# Patient Record
Sex: FEMALE | Race: WHITE | NOT HISPANIC OR LATINO | Employment: FULL TIME | URBAN - METROPOLITAN AREA
[De-identification: names, ages, dates, MRNs, and addresses within clinical notes are randomized per-mention and may not be internally consistent; named-entity substitution may affect disease eponyms.]

---

## 2018-12-19 ENCOUNTER — OFFICE VISIT (OUTPATIENT)
Dept: OBSTETRICS AND GYNECOLOGY | Facility: CLINIC | Age: 53
End: 2018-12-19
Payer: OTHER GOVERNMENT

## 2018-12-19 VITALS
WEIGHT: 191.81 LBS | DIASTOLIC BLOOD PRESSURE: 84 MMHG | HEIGHT: 64 IN | BODY MASS INDEX: 32.74 KG/M2 | SYSTOLIC BLOOD PRESSURE: 136 MMHG

## 2018-12-19 DIAGNOSIS — Z01.419 ENCOUNTER FOR GYNECOLOGICAL EXAMINATION (GENERAL) (ROUTINE) WITHOUT ABNORMAL FINDINGS: Primary | ICD-10-CM

## 2018-12-19 DIAGNOSIS — Z12.4 SCREENING FOR CERVICAL CANCER: ICD-10-CM

## 2018-12-19 DIAGNOSIS — Z12.31 SCREENING MAMMOGRAM, ENCOUNTER FOR: ICD-10-CM

## 2018-12-19 DIAGNOSIS — R68.82 DECREASED LIBIDO: ICD-10-CM

## 2018-12-19 PROBLEM — Z86.2 HISTORY OF IRON DEFICIENCY ANEMIA: Status: ACTIVE | Noted: 2017-03-20

## 2018-12-19 PROBLEM — J30.1 SEASONAL ALLERGIC RHINITIS DUE TO POLLEN: Status: ACTIVE | Noted: 2017-03-20

## 2018-12-19 PROCEDURE — 88175 CYTOPATH C/V AUTO FLUID REDO: CPT

## 2018-12-19 PROCEDURE — 99999 PR PBB SHADOW E&M-NEW PATIENT-LVL III: CPT | Mod: PBBFAC,,, | Performed by: OBSTETRICS & GYNECOLOGY

## 2018-12-19 PROCEDURE — 87624 HPV HI-RISK TYP POOLED RSLT: CPT

## 2018-12-19 PROCEDURE — 99203 OFFICE O/P NEW LOW 30 MIN: CPT | Mod: PBBFAC,PN | Performed by: OBSTETRICS & GYNECOLOGY

## 2018-12-19 PROCEDURE — 99386 PREV VISIT NEW AGE 40-64: CPT | Mod: S$PBB,,, | Performed by: OBSTETRICS & GYNECOLOGY

## 2018-12-19 RX ORDER — MONTELUKAST SODIUM 10 MG/1
10 TABLET ORAL DAILY
COMMUNITY

## 2018-12-19 RX ORDER — FLUTICASONE FUROATE AND VILANTEROL 200; 25 UG/1; UG/1
POWDER RESPIRATORY (INHALATION)
COMMUNITY
Start: 2018-03-15

## 2018-12-19 NOTE — PROGRESS NOTES
Subjective:       Patient ID: Beena Potter is a 53 y.o. female.    Chief Complaint:  Well Woman      History of Present Illness  HPI  Annual Exam-Postmenopausal  Patient presents for annual exam. The patient has no complaints today. The patient is sexually active.--occasional vaginal dryness; libido ok; denies pelvic pain;  GYN screening history: last pap: approximate date  and was normal. mammo due; normal 2017l; The patient is not currently taking hormone replacement therapy. Patient denies post-menopausal vaginal bleeding. The patient wears seatbelts: yes. The patient participates in regular exercise: yes--walkig on treadmill--45 min--5 days/wk; . Has the patient ever been transfused or tattooed?: yes.--transfused years ago;  The patient reports that there is not domestic violence in her life.    Denies hot flushes, night sweat  No problems sleeping    occas leak with jumping; no urgency;          GYN & OB History  Patient's last menstrual period was 2014 (approximate).   Date of Last Pap: No result found    OB History    Para Term  AB Living   2 2       2   SAB TAB Ectopic Multiple Live Births           2      # Outcome Date GA Lbr Iam/2nd Weight Sex Delivery Anes PTL Lv   2 Para      Vag-Spont   EDU   1 Para      Vag-Spont   EDU          Review of Systems  Review of Systems   All other systems reviewed and are negative.          Objective:      Physical Exam:   Constitutional: She appears well-developed.     Eyes: Conjunctivae and EOM are normal. Pupils are equal, round, and reactive to light.    Neck: Normal range of motion. Neck supple.     Pulmonary/Chest: Effort normal. Right breast exhibits no mass, no nipple discharge, no skin change and no tenderness. Left breast exhibits no mass, no nipple discharge, no skin change and no tenderness. Breasts are symmetrical.        Abdominal: Soft.     Genitourinary: Rectum normal, vagina normal and uterus normal. Pelvic exam was performed  with patient supine. Cervix is normal. Right adnexum displays no mass and no tenderness. Left adnexum displays no mass and no tenderness. No erythema, bleeding, rectocele, cystocele or unspecified prolapse of vaginal walls in the vagina. No vaginal discharge found. Labial bartholins normal.  Genitourinary Comments: Atrophic          Uterus Size: 6 cm   Musculoskeletal: Normal range of motion.       Neurological: She is alert.    Skin: Skin is warm.    Psychiatric: She has a normal mood and affect.           Assessment:     Encounter Diagnoses   Name Primary?    Encounter for gynecological examination (general) (routine) without abnormal findings Yes    Screening for cervical cancer     Screening mammogram, encounter for     Decreased libido                Plan:      Continue annual well woman exam.  Pap today; Reviewed updated recommendations for pap smears (every 3 years) in low risk patients.   Recommend annual pelvic exams.  Reviewed recommendations for annual CBE.  mammo ordered, continue yearly until age 75  Osteoporosis prevention; 1200mg calcium/d with source of vitamin d   Continue diet, exercise, weight loss   reviewed use of replens and avlamil for vaginal atrophy and decreased libido

## 2018-12-26 LAB
HPV HR 12 DNA CVX QL NAA+PROBE: NEGATIVE
HPV16 AG SPEC QL: NEGATIVE
HPV18 DNA SPEC QL NAA+PROBE: NEGATIVE

## 2019-01-02 ENCOUNTER — TELEPHONE (OUTPATIENT)
Dept: OBSTETRICS AND GYNECOLOGY | Facility: CLINIC | Age: 54
End: 2019-01-02

## 2019-01-17 ENCOUNTER — HOSPITAL ENCOUNTER (OUTPATIENT)
Dept: RADIOLOGY | Facility: HOSPITAL | Age: 54
Discharge: HOME | End: 2019-01-17
Attending: OBSTETRICS & GYNECOLOGY
Payer: OTHER GOVERNMENT

## 2019-01-17 DIAGNOSIS — Z12.31 SCREENING MAMMOGRAM, ENCOUNTER FOR: ICD-10-CM

## 2019-01-17 PROCEDURE — 77067 MAMMO DIGITAL SCREENING BILAT WITH CAD: ICD-10-PCS | Mod: 26,,, | Performed by: RADIOLOGY

## 2019-01-17 PROCEDURE — 77067 SCR MAMMO BI INCL CAD: CPT | Mod: 26,,, | Performed by: RADIOLOGY

## 2019-01-17 PROCEDURE — 77067 SCR MAMMO BI INCL CAD: CPT | Mod: TC

## 2019-01-29 ENCOUNTER — TELEPHONE (OUTPATIENT)
Dept: OBSTETRICS AND GYNECOLOGY | Facility: CLINIC | Age: 54
End: 2019-01-29

## 2019-01-29 NOTE — TELEPHONE ENCOUNTER
Spoke to the pt. And advised her that her mammogram shows left breast calcifications (she should have been contacted by radiology to schedule additional imaging) I will continue to look out for the follow up report. Pt. Stated she has an appt,. with Radiology. carli Cisse    Please advise her mammogram shows left breast calcifications (she should have been contacted by radiology to schedule additional imaging) I will continue to look out for the follow up report

## 2019-01-31 ENCOUNTER — HOSPITAL ENCOUNTER (OUTPATIENT)
Dept: RADIOLOGY | Facility: HOSPITAL | Age: 54
Discharge: HOME | End: 2019-01-31
Attending: OBSTETRICS & GYNECOLOGY
Payer: OTHER GOVERNMENT

## 2019-01-31 DIAGNOSIS — R92.8 ABNORMAL MAMMOGRAM: ICD-10-CM

## 2019-01-31 PROCEDURE — 77065 DX MAMMO INCL CAD UNI: CPT | Mod: TC,LT

## 2019-01-31 PROCEDURE — 77065 DX MAMMO INCL CAD UNI: CPT | Mod: 26,LT,, | Performed by: RADIOLOGY

## 2019-01-31 PROCEDURE — 77065 MAMMO DIGITAL DIAGNOSTIC LEFT WITH CAD: ICD-10-PCS | Mod: 26,LT,, | Performed by: RADIOLOGY

## 2019-02-01 ENCOUNTER — TELEPHONE (OUTPATIENT)
Dept: RADIOLOGY | Facility: HOSPITAL | Age: 54
End: 2019-02-01

## 2019-08-01 ENCOUNTER — HOSPITAL ENCOUNTER (OUTPATIENT)
Dept: RADIOLOGY | Facility: HOSPITAL | Age: 54
Discharge: HOME | End: 2019-08-01
Attending: OBSTETRICS & GYNECOLOGY
Payer: OTHER GOVERNMENT

## 2019-08-01 DIAGNOSIS — R92.8 ABNORMAL MAMMOGRAM: ICD-10-CM

## 2019-08-01 PROCEDURE — 77065 DX MAMMO INCL CAD UNI: CPT | Mod: TC,LT

## 2019-08-01 PROCEDURE — 77065 MAMMO DIGITAL DIAGNOSTIC LEFT WITH CAD: ICD-10-PCS | Mod: 26,LT,, | Performed by: RADIOLOGY

## 2019-08-01 PROCEDURE — 77065 DX MAMMO INCL CAD UNI: CPT | Mod: 26,LT,, | Performed by: RADIOLOGY

## 2020-02-20 ENCOUNTER — HOSPITAL ENCOUNTER (OUTPATIENT)
Dept: RADIOLOGY | Facility: HOSPITAL | Age: 55
Discharge: HOME | End: 2020-02-20
Attending: OBSTETRICS & GYNECOLOGY
Payer: OTHER GOVERNMENT

## 2020-02-20 DIAGNOSIS — R92.8 ABNORMAL MAMMOGRAM: ICD-10-CM

## 2020-02-20 PROCEDURE — 77066 MAMMO DIGITAL DIAGNOSTIC BILAT WITH CAD: ICD-10-PCS | Mod: 26,,, | Performed by: RADIOLOGY

## 2020-02-20 PROCEDURE — 77066 DX MAMMO INCL CAD BI: CPT | Mod: 26,,, | Performed by: RADIOLOGY

## 2020-02-20 PROCEDURE — 77066 DX MAMMO INCL CAD BI: CPT | Mod: TC

## 2021-04-29 ENCOUNTER — PATIENT MESSAGE (OUTPATIENT)
Dept: RESEARCH | Facility: HOSPITAL | Age: 56
End: 2021-04-29

## 2022-01-06 ENCOUNTER — OFFICE VISIT (OUTPATIENT)
Dept: OBSTETRICS AND GYNECOLOGY | Facility: CLINIC | Age: 57
End: 2022-01-06
Payer: OTHER GOVERNMENT

## 2022-01-06 VITALS
BODY MASS INDEX: 39.11 KG/M2 | WEIGHT: 229.06 LBS | DIASTOLIC BLOOD PRESSURE: 90 MMHG | HEIGHT: 64 IN | SYSTOLIC BLOOD PRESSURE: 132 MMHG

## 2022-01-06 DIAGNOSIS — Z12.31 SCREENING MAMMOGRAM, ENCOUNTER FOR: Primary | ICD-10-CM

## 2022-01-06 DIAGNOSIS — Z12.4 SCREENING FOR CERVICAL CANCER: ICD-10-CM

## 2022-01-06 DIAGNOSIS — Z01.419 ENCOUNTER FOR GYNECOLOGICAL EXAMINATION (GENERAL) (ROUTINE) WITHOUT ABNORMAL FINDINGS: ICD-10-CM

## 2022-01-06 DIAGNOSIS — N39.3 STRESS INCONTINENCE OF URINE: ICD-10-CM

## 2022-01-06 PROCEDURE — 99999 PR PBB SHADOW E&M-EST. PATIENT-LVL III: CPT | Mod: PBBFAC,,, | Performed by: OBSTETRICS & GYNECOLOGY

## 2022-01-06 PROCEDURE — 99213 OFFICE O/P EST LOW 20 MIN: CPT | Mod: PBBFAC,PN | Performed by: OBSTETRICS & GYNECOLOGY

## 2022-01-06 PROCEDURE — 87624 HPV HI-RISK TYP POOLED RSLT: CPT | Performed by: OBSTETRICS & GYNECOLOGY

## 2022-01-06 PROCEDURE — 99999 PR PBB SHADOW E&M-EST. PATIENT-LVL III: ICD-10-PCS | Mod: PBBFAC,,, | Performed by: OBSTETRICS & GYNECOLOGY

## 2022-01-06 PROCEDURE — 88175 CYTOPATH C/V AUTO FLUID REDO: CPT | Performed by: OBSTETRICS & GYNECOLOGY

## 2022-01-06 PROCEDURE — 99386 PREV VISIT NEW AGE 40-64: CPT | Mod: S$PBB,,, | Performed by: OBSTETRICS & GYNECOLOGY

## 2022-01-06 PROCEDURE — 99386 PR PREVENTIVE VISIT,NEW,40-64: ICD-10-PCS | Mod: S$PBB,,, | Performed by: OBSTETRICS & GYNECOLOGY

## 2022-01-06 RX ORDER — TESTOSTERONE ENANTHATE 200 MG/ML
VIAL (ML) INTRAMUSCULAR
COMMUNITY
End: 2022-01-06 | Stop reason: CLARIF

## 2022-01-06 RX ORDER — OXYBUTYNIN CHLORIDE 5 MG/1
5 TABLET ORAL 2 TIMES DAILY
Qty: 60 TABLET | Refills: 11 | Status: SHIPPED | OUTPATIENT
Start: 2022-01-06 | End: 2022-07-11

## 2022-01-06 RX ORDER — SPIRONOLACTONE 25 MG/1
25 TABLET ORAL 2 TIMES DAILY
COMMUNITY
Start: 2021-11-15

## 2022-01-06 RX ORDER — PROGESTERONE 200 MG/1
CAPSULE ORAL
COMMUNITY
Start: 2021-12-27

## 2022-01-06 NOTE — PROGRESS NOTES
Subjective:       Patient ID: Beena Potter is a 56 y.o. female.    Chief Complaint:  Well Woman      History of Present Illness  HPI  Annual Exam-Postmenopausal  Patient presents for annual exam. The patient reports worsening leaking with cough/sneeze denies urgency; . The patient is sexually active.--sex drive improved; since testosterone pellets;  GYN screening history: last pap: approximate date 2018 and was normal and last mammogram: approximate date  and was normal. The patient is taking hormone replacement therapy--using estrogen/testosterone pellets; . Patient denies post-menopausal vaginal bleeding. The patient wears seatbelts: yes. The patient participates in regular exercise: no. Still in physical therapy since foot surgery;  Has the patient ever been transfused or tattooed?: yes.-+transfused   The patient reports that there is not domestic violence in her life.  No problems sleeping  Denies hot flushes;   GYN & OB History  Patient's last menstrual period was 2014 (approximate).   Date of Last Pap: No result found    OB History    Para Term  AB Living   2 2 0     2   SAB IAB Ectopic Multiple Live Births           2      # Outcome Date GA Lbr Iam/2nd Weight Sex Delivery Anes PTL Lv   2 Para      Vag-Spont   EDU   1 Para      Vag-Spont   EDU       Review of Systems  Review of Systems   Genitourinary: Positive for decreased libido, hot flashes and urinary incontinence.   All other systems reviewed and are negative.          Objective:      Physical Exam:   Constitutional: She is oriented to person, place, and time. She appears well-developed and well-nourished.     Eyes: Pupils are equal, round, and reactive to light. Conjunctivae and EOM are normal.      Pulmonary/Chest: Effort normal. Right breast exhibits no mass, no nipple discharge, no skin change and no tenderness. Left breast exhibits no mass, no nipple discharge, no skin change and no tenderness. Breasts are symmetrical.         Abdominal: Soft.     Genitourinary:    Vagina, uterus, right adnexa, left adnexa and rectum normal.      Pelvic exam was performed with patient supine.   The external female genitalia was normal.   Labial bartholins normal.Cervix is normal. Right adnexum displays no mass and no tenderness. Left adnexum displays no mass and no tenderness. No erythema,  no vaginal discharge, bleeding, rectocele, cystocele or unspecified prolapse of vaginal walls in the vagina. Vagina was moist.Also,  pap results normal  (collected)  Uerus contour normal  Normal urethral meatus.Urethra findings: no urethral massBladder findings: no bladder distention and no bladder tenderness          Musculoskeletal: Normal range of motion and moves all extremeties.       Neurological: She is alert and oriented to person, place, and time.    Skin: Skin is warm.    Psychiatric: She has a normal mood and affect. Her behavior is normal.           Assessment:        1. Screening mammogram, encounter for    2. Encounter for gynecological examination (general) (routine) without abnormal findings    3. Screening for cervical cancer    4. Stress incontinence of urine               Plan:      Continue annual well woman exam.  Pap today; Reviewed updated recommendations for pap smears (every 3 years) in low risk patients.   Recommend annual pelvic exams.  Reviewed recommendations for annual CBE.  mammo ordered, continue yearly until age 77  rx sent for oxybutynin for bladder  Continue diet, exercise, weight loss

## 2022-01-10 ENCOUNTER — HOSPITAL ENCOUNTER (OUTPATIENT)
Dept: RADIOLOGY | Facility: HOSPITAL | Age: 57
Discharge: HOME | End: 2022-01-10
Attending: OBSTETRICS & GYNECOLOGY
Payer: OTHER GOVERNMENT

## 2022-01-10 VITALS — HEIGHT: 64 IN | WEIGHT: 229.06 LBS | BODY MASS INDEX: 39.11 KG/M2

## 2022-01-10 DIAGNOSIS — Z12.31 SCREENING MAMMOGRAM, ENCOUNTER FOR: ICD-10-CM

## 2022-01-10 PROCEDURE — 77063 BREAST TOMOSYNTHESIS BI: CPT | Mod: 26,,, | Performed by: RADIOLOGY

## 2022-01-10 PROCEDURE — 77063 MAMMO DIGITAL SCREENING BILAT WITH TOMO: ICD-10-PCS | Mod: 26,,, | Performed by: RADIOLOGY

## 2022-01-10 PROCEDURE — 77067 MAMMO DIGITAL SCREENING BILAT WITH TOMO: ICD-10-PCS | Mod: 26,,, | Performed by: RADIOLOGY

## 2022-01-10 PROCEDURE — 77067 SCR MAMMO BI INCL CAD: CPT | Mod: 26,,, | Performed by: RADIOLOGY

## 2022-01-10 PROCEDURE — 77063 BREAST TOMOSYNTHESIS BI: CPT | Mod: TC

## 2022-01-11 LAB
FINAL PATHOLOGIC DIAGNOSIS: NORMAL
Lab: NORMAL

## 2022-01-17 LAB
HPV HR 12 DNA SPEC QL NAA+PROBE: NEGATIVE
HPV16 AG SPEC QL: NEGATIVE
HPV18 DNA SPEC QL NAA+PROBE: NEGATIVE

## 2022-01-19 ENCOUNTER — PATIENT MESSAGE (OUTPATIENT)
Dept: OBSTETRICS AND GYNECOLOGY | Facility: CLINIC | Age: 57
End: 2022-01-19
Payer: OTHER GOVERNMENT

## 2022-01-19 DIAGNOSIS — N39.3 STRESS INCONTINENCE OF URINE: Primary | ICD-10-CM

## 2022-06-30 ENCOUNTER — TELEPHONE (OUTPATIENT)
Dept: OBSTETRICS AND GYNECOLOGY | Facility: CLINIC | Age: 57
End: 2022-06-30
Payer: OTHER GOVERNMENT

## 2022-06-30 NOTE — TELEPHONE ENCOUNTER
----- Message from Heidi Guillen sent at 6/30/2022  1:13 PM CDT -----  Contact: Patient, [phone]  Calling to schedule an appointment with Dr Babin in [place]. Please call her. Thanks.

## 2022-07-11 ENCOUNTER — OFFICE VISIT (OUTPATIENT)
Dept: OBSTETRICS AND GYNECOLOGY | Facility: CLINIC | Age: 57
End: 2022-07-11
Payer: OTHER GOVERNMENT

## 2022-07-11 VITALS
SYSTOLIC BLOOD PRESSURE: 122 MMHG | BODY MASS INDEX: 39.38 KG/M2 | DIASTOLIC BLOOD PRESSURE: 86 MMHG | HEIGHT: 64 IN | WEIGHT: 230.69 LBS

## 2022-07-11 DIAGNOSIS — N95.0 POST-MENOPAUSAL BLEEDING: Primary | ICD-10-CM

## 2022-07-11 DIAGNOSIS — N39.3 STRESS INCONTINENCE OF URINE: ICD-10-CM

## 2022-07-11 PROCEDURE — 99999 PR PBB SHADOW E&M-EST. PATIENT-LVL III: CPT | Mod: PBBFAC,,, | Performed by: OBSTETRICS & GYNECOLOGY

## 2022-07-11 PROCEDURE — 99213 PR OFFICE/OUTPT VISIT, EST, LEVL III, 20-29 MIN: ICD-10-PCS | Mod: S$PBB,,, | Performed by: OBSTETRICS & GYNECOLOGY

## 2022-07-11 PROCEDURE — 99999 PR PBB SHADOW E&M-EST. PATIENT-LVL III: ICD-10-PCS | Mod: PBBFAC,,, | Performed by: OBSTETRICS & GYNECOLOGY

## 2022-07-11 PROCEDURE — 99213 OFFICE O/P EST LOW 20 MIN: CPT | Mod: PBBFAC,PN | Performed by: OBSTETRICS & GYNECOLOGY

## 2022-07-11 PROCEDURE — 99213 OFFICE O/P EST LOW 20 MIN: CPT | Mod: S$PBB,,, | Performed by: OBSTETRICS & GYNECOLOGY

## 2022-07-11 NOTE — PROGRESS NOTES
"History & Physical    SUBJECTIVE:     History of Present Illness:  Patient is a 56 y.o. female presents with postmenopausal bleeding for past 2 wks; reports menopausal since xrt in 2014.  Currently using estrogen/testosterone pellets and nightly prometrium (200mg  Added additional prometrium 100mg bid .  Had informal sono showing polyps.  Wishes to proceed with hysteroscopy with d&c/myosure    Review of patient's allergies indicates:   Allergen Reactions    Scopolamine Rash       Current Outpatient Medications   Medication Sig Dispense Refill    estradiol 50 mg pellet Inject 50 mg into the muscle.      fluticasone furoate-vilanteroL (BREO) 200-25 mcg/dose DsDv diskus inhaler       montelukast (SINGULAIR) 10 mg tablet Take 10 mg by mouth.      progesterone (PROMETRIUM) 200 MG capsule TAKE 1 CAPSULE BY MOUTH NIGHTLY AT BEDTIME      spironolactone (ALDACTONE) 25 MG tablet Take 25 mg by mouth 2 (two) times daily.      testosterone 100 mg pellet Inject 100 mg into the muscle.      mirabegron (MYRBETRIQ) 25 mg Tb24 ER tablet Take 1 tablet (25 mg total) by mouth once daily. 30 tablet 11     No current facility-administered medications for this visit.       Past Medical History:   Diagnosis Date    Asthma     Colon cancer 2013    radiation and chemo    Rectal cancer 08/01/2012     Past Surgical History:   Procedure Laterality Date    colon cancer  08/01/2012    FOOT SURGERY Right      Family History   Problem Relation Age of Onset    Hypertension Father     Hypertension Mother      Social History     Tobacco Use    Smoking status: Never Smoker    Smokeless tobacco: Never Used   Substance Use Topics    Alcohol use: No    Drug use: No        Review of Systems:  Review of Systems   Genitourinary: Positive for menstrual problem.   All other systems reviewed and are negative.      OBJECTIVE:     Vital Signs (Most Recent)  BP: 122/86 (07/11/22 0732)  5' 4" (1.626 m)  104.6 kg (230 lb 11.4 oz)     Physical " Exam:  Physical Exam  Vitals reviewed.   Constitutional:       Appearance: She is well-developed.   HENT:      Head: Normocephalic.   Eyes:      Conjunctiva/sclera: Conjunctivae normal.      Pupils: Pupils are equal, round, and reactive to light.   Cardiovascular:      Rate and Rhythm: Normal rate.   Pulmonary:      Effort: Pulmonary effort is normal.      Breath sounds: Normal breath sounds.   Abdominal:      Palpations: Abdomen is soft.   Musculoskeletal:         General: Normal range of motion.      Cervical back: Normal range of motion.   Skin:     General: Skin is warm and dry.   Neurological:      Mental Status: She is alert and oriented to person, place, and time.   Psychiatric:         Behavior: Behavior normal.         Thought Content: Thought content normal.         Judgment: Judgment normal.         Laboratory  cbc, bmp, hcg to be done preop    Diagnostic Results:  none    ASSESSMENT/PLAN:     Encounter Diagnoses   Name Primary?    Post-menopausal bleeding Yes    Stress incontinence of urine          PLAN:Plan     Reviewed hysteroscopy with d&c/myosure  procedure in detail.  Reviewed risks including but not limited to infection, bleeding, damage to bowel/bladder, cva;htn, dvt, death..    All questions answered to the best of my ability.  Alternatives reviewed --emb.  Pt wishes to proceed with hysteroscopy with d&c/myosure.    Consents signed witnessed  Post op course reviewed

## 2022-07-11 NOTE — PROGRESS NOTES
Subjective:       Patient ID: Beena Potter is a 56 y.o. female.    Chief Complaint:  No chief complaint on file.      History of Present Illness  HPI  {OBG HPI BLOCKS:35705}    GYN & OB History  Patient's last menstrual period was 2014 (approximate).   Date of Last Pap: 2022    OB History    Para Term  AB Living   2 2 0     2   SAB IAB Ectopic Multiple Live Births           2      # Outcome Date GA Lbr Iam/2nd Weight Sex Delivery Anes PTL Lv   2 Para      Vag-Spont   EDU   1 Para      Vag-Spont   EDU       Review of Systems  Review of Systems        Objective:      Physical Exam        Assessment:        1. Post-menopausal bleeding    2. Stress incontinence of urine               Plan:      Continue annual well woman exam.

## 2022-07-12 ENCOUNTER — TELEPHONE (OUTPATIENT)
Dept: OBSTETRICS AND GYNECOLOGY | Facility: CLINIC | Age: 57
End: 2022-07-12
Payer: OTHER GOVERNMENT

## 2022-07-12 DIAGNOSIS — N95.0 POST-MENOPAUSAL BLEEDING: Primary | ICD-10-CM

## 2022-07-21 ENCOUNTER — PATIENT MESSAGE (OUTPATIENT)
Dept: OBSTETRICS AND GYNECOLOGY | Facility: CLINIC | Age: 57
End: 2022-07-21
Payer: OTHER GOVERNMENT

## 2022-07-21 NOTE — TELEPHONE ENCOUNTER
PA done, pt require to try to use detrol LA or oxybutynin IR      pharmacy program   Case#[id number]   Telephone: [phone]

## 2022-08-18 DIAGNOSIS — Z01.818 PRE-OP TESTING: Primary | ICD-10-CM

## 2022-08-18 NOTE — PRE ADMISSION SCREENING
Pre op instructions reviewed with Pt per phone: Spoke about pre op process and surgery instructions, verbalized understanding.    Surgery is scheduled on 8/31/22. Please arrive at 6:45am. We will call you the afternoon prior to surgery to confirm arrival time, as it is subject to change due to cancellations & emergencies.    Please report to the [facility] (1st Floor) at [facility] located off of [facility] access hospital (2nd building on the left, in front of the flag pole).  Address: [address] [place] [address] [place]. [address]        INSTRUCTIONS IMPORTANT!!!  Do Not Eat, Drink, or Smoke after 12 midnight unless instructed otherwise by your Surgeon. OK to brush teeth, no gum, candy or mints!      *Take Only these medications with a small sip of water morning of surgery:  Trelegy inhaler    ____  NO Acrylic/fake nails or nail polish worn day of surgery (specifically hand/arm & foot surgeries).  ____  NO powder, lotions, deodorants, oils or creams on body.  ____  Remove jewelry & piercings prior to surgery.  ____  Dentures, Hearing Aids & Contact Lens will need to be removed prior to the start of surgery.  ____  Bring photo ID and insurance information to hospital (Leave Valuables at Home).  ____  If going home the same day, arrange for a ride home. You will not be able to drive 24 hrs if Anesthesia was used.   ____  Females (ages 11-60) may need to give a urine sample the morning of surgery; please see Pre op Nurse prior to using the restroom.  ____  Wear clean, loose fitting clothing. Allow for dressings, bandages.  ____  Stop all Aspirin products, Ibuprofen, Advil, Motrin & Aleve at least 5-7 days before surgery, unless otherwise instructed by your Physician office (May use Tylenol).  ____  Blood Thinners are stopped based on your Provider's recommendation; Call Surgeon's Office to inquire when to stop/hold.  ____  Stop taking any Fish Oil supplements or Vitamins at least 5-7 days prior to surgery, unless instructed  otherwise by your Physician office.            Diabetic Patients: If you take diabetic medication, do NOT take morning of surgery unless instructed by             Doctor. Metformin to be stopped 24 hrs prior to surgery time. DO NOT take long-acting insulin the evening before surgery. Blood sugars will be checked in pre-op by Nurse.    Bathing Instructions:    -Do not shave your face or body the day before or the day of surgery.  -Do not shave pubic hair 7 days prior to surgery (gyn pt's).   -Shower & Rinse your body as usual with anti-bacterial Soap (Dial, Lever 2000, or Hibiclens)   -Do not use Hibiclens on your head, face, or genitals.   -Do not wash with anti-bacterial soap after you use the Hibiclens.   -Rinse your body thoroughly.      [facility] Visitor/Ride Policy:  Only 2 adults allowed (over the age of 18) to accompany you to the Hospital. You Must have a ride home from a responsible adult that you know and trust. Medical Transport, Uber or Lyft can only be used if patient has a responsible adult to accompany them during ride home.    Post-Op Instructions: You will receive Post-op/Discharge instructions by your Discharge Nurse prior to going home. Please call your Surgeon's office with any post-surgery questions/concerns @ [phone].    *If you are running late or have questions the morning of surgery, please call the Surgery Dept @ [phone]  *Insurance/ Financial Questions, please call [phone].    Thank you,  -[facility] Pre Admit Testing Dept.  [phone] or [phone]  M-F 7:30 am-4 pm

## 2022-08-24 ENCOUNTER — LAB VISIT (OUTPATIENT)
Dept: LAB | Facility: HOSPITAL | Age: 57
End: 2022-08-24
Attending: OBSTETRICS & GYNECOLOGY
Payer: OTHER GOVERNMENT

## 2022-08-24 DIAGNOSIS — N95.0 POST-MENOPAUSAL BLEEDING: ICD-10-CM

## 2022-08-24 LAB
ANION GAP SERPL CALC-SCNC: 8 MMOL/L (ref 8–16)
BASOPHILS # BLD AUTO: 0.08 K/UL (ref 0–0.2)
BASOPHILS NFR BLD: 1.2 % (ref 0–1.9)
BUN SERPL-MCNC: 17 MG/DL (ref 6–20)
CALCIUM SERPL-MCNC: 9.1 MG/DL (ref 8.7–10.5)
CHLORIDE SERPL-SCNC: 103 MMOL/L (ref 95–110)
CO2 SERPL-SCNC: 25 MMOL/L (ref 23–29)
CREAT SERPL-MCNC: 0.8 MG/DL (ref 0.5–1.4)
DIFFERENTIAL METHOD: ABNORMAL
EOSINOPHIL # BLD AUTO: 0.3 K/UL (ref 0–0.5)
EOSINOPHIL NFR BLD: 4.6 % (ref 0–8)
ERYTHROCYTE [DISTWIDTH] IN BLOOD BY AUTOMATED COUNT: 12 % (ref 11.5–14.5)
EST. GFR  (NO RACE VARIABLE): >60 ML/MIN/1.73 M^2
GLUCOSE SERPL-MCNC: 128 MG/DL (ref 70–110)
HCT VFR BLD AUTO: 42.2 % (ref 37–48.5)
HGB BLD-MCNC: 14.1 G/DL (ref 12–16)
IMM GRANULOCYTES # BLD AUTO: 0.03 K/UL (ref 0–0.04)
IMM GRANULOCYTES NFR BLD AUTO: 0.4 % (ref 0–0.5)
LYMPHOCYTES # BLD AUTO: 2.1 K/UL (ref 1–4.8)
LYMPHOCYTES NFR BLD: 30 % (ref 18–48)
MCH RBC QN AUTO: 31.8 PG (ref 27–31)
MCHC RBC AUTO-ENTMCNC: 33.4 G/DL (ref 32–36)
MCV RBC AUTO: 95 FL (ref 82–98)
MONOCYTES # BLD AUTO: 0.5 K/UL (ref 0.3–1)
MONOCYTES NFR BLD: 7.5 % (ref 4–15)
NEUTROPHILS # BLD AUTO: 3.9 K/UL (ref 1.8–7.7)
NEUTROPHILS NFR BLD: 56.3 % (ref 38–73)
NRBC BLD-RTO: 0 /100 WBC
PLATELET # BLD AUTO: 265 K/UL (ref 150–450)
PMV BLD AUTO: 11 FL (ref 9.2–12.9)
POTASSIUM SERPL-SCNC: 4.3 MMOL/L (ref 3.5–5.1)
RBC # BLD AUTO: 4.44 M/UL (ref 4–5.4)
SODIUM SERPL-SCNC: 136 MMOL/L (ref 136–145)
WBC # BLD AUTO: 6.9 K/UL (ref 3.9–12.7)

## 2022-08-24 PROCEDURE — 85025 COMPLETE CBC W/AUTO DIFF WBC: CPT | Performed by: OBSTETRICS & GYNECOLOGY

## 2022-08-24 PROCEDURE — 36415 COLL VENOUS BLD VENIPUNCTURE: CPT | Mod: PO | Performed by: OBSTETRICS & GYNECOLOGY

## 2022-08-24 PROCEDURE — 80048 BASIC METABOLIC PNL TOTAL CA: CPT | Performed by: OBSTETRICS & GYNECOLOGY

## 2022-08-30 ENCOUNTER — TELEPHONE (OUTPATIENT)
Dept: PREADMISSION TESTING | Facility: HOSPITAL | Age: 57
End: 2022-08-30
Payer: OTHER GOVERNMENT

## 2022-08-30 ENCOUNTER — ANESTHESIA EVENT (OUTPATIENT)
Dept: SURGERY | Facility: HOSPITAL | Age: 57
End: 2022-08-30
Payer: OTHER GOVERNMENT

## 2022-08-30 NOTE — TELEPHONE ENCOUNTER
Called and spoke with pt about the following:     Please arrive to [facility] main lobby at 0645 am on 8/31/22 for your scheduled procedure. NOTHING to eat or drink after midnight unless instructed otherwise by your Surgeon. Take only the medications instructed by your Pre Admit Nurse with small sip of water.    Thank you,  -Pre Admit Testing Dept.  Mon-Fri 8 am - 4 pm [phone]

## 2022-08-31 ENCOUNTER — HOSPITAL ENCOUNTER (OUTPATIENT)
Facility: HOSPITAL | Age: 57
Discharge: HOME | End: 2022-08-31
Attending: OBSTETRICS & GYNECOLOGY | Admitting: OBSTETRICS & GYNECOLOGY
Payer: OTHER GOVERNMENT

## 2022-08-31 ENCOUNTER — ANESTHESIA (OUTPATIENT)
Dept: SURGERY | Facility: HOSPITAL | Age: 57
End: 2022-08-31
Payer: OTHER GOVERNMENT

## 2022-08-31 DIAGNOSIS — N95.0 POSTMENOPAUSAL BLEEDING: ICD-10-CM

## 2022-08-31 DIAGNOSIS — Z98.890 STATUS POST HYSTEROSCOPY: Primary | ICD-10-CM

## 2022-08-31 LAB
B-HCG UR QL: NEGATIVE
CTP QC/QA: YES

## 2022-08-31 PROCEDURE — 37000008 HC ANESTHESIA 1ST 15 MINUTES: Performed by: OBSTETRICS & GYNECOLOGY

## 2022-08-31 PROCEDURE — 00952 ANES VAG PX HYSTSC&/HSG: CPT | Performed by: OBSTETRICS & GYNECOLOGY

## 2022-08-31 PROCEDURE — 27201423 OPTIME MED/SURG SUP & DEVICES STERILE SUPPLY: Performed by: OBSTETRICS & GYNECOLOGY

## 2022-08-31 PROCEDURE — 88305 TISSUE EXAM BY PATHOLOGIST: CPT | Mod: 26,,, | Performed by: PATHOLOGY

## 2022-08-31 PROCEDURE — 25000003 PHARM REV CODE 250: Performed by: OBSTETRICS & GYNECOLOGY

## 2022-08-31 PROCEDURE — 71000033 HC RECOVERY, INTIAL HOUR: Performed by: OBSTETRICS & GYNECOLOGY

## 2022-08-31 PROCEDURE — 58558 HYSTEROSCOPY BIOPSY: CPT | Mod: ,,, | Performed by: OBSTETRICS & GYNECOLOGY

## 2022-08-31 PROCEDURE — C1782 MORCELLATOR: HCPCS | Performed by: OBSTETRICS & GYNECOLOGY

## 2022-08-31 PROCEDURE — 81025 URINE PREGNANCY TEST: CPT | Performed by: OBSTETRICS & GYNECOLOGY

## 2022-08-31 PROCEDURE — 58558 PR HYSTEROSCOPY,W/ENDO BX: ICD-10-PCS | Mod: ,,, | Performed by: OBSTETRICS & GYNECOLOGY

## 2022-08-31 PROCEDURE — 36000706: Performed by: OBSTETRICS & GYNECOLOGY

## 2022-08-31 PROCEDURE — 25000003 PHARM REV CODE 250: Performed by: STUDENT IN AN ORGANIZED HEALTH CARE EDUCATION/TRAINING PROGRAM

## 2022-08-31 PROCEDURE — 88305 TISSUE EXAM BY PATHOLOGIST: ICD-10-PCS | Mod: 26,,, | Performed by: PATHOLOGY

## 2022-08-31 PROCEDURE — 71000015 HC POSTOP RECOV 1ST HR: Performed by: OBSTETRICS & GYNECOLOGY

## 2022-08-31 PROCEDURE — 88305 TISSUE EXAM BY PATHOLOGIST: CPT | Performed by: PATHOLOGY

## 2022-08-31 PROCEDURE — 63600175 PHARM REV CODE 636 W HCPCS: Performed by: STUDENT IN AN ORGANIZED HEALTH CARE EDUCATION/TRAINING PROGRAM

## 2022-08-31 PROCEDURE — 37000009 HC ANESTHESIA EA ADD 15 MINS: Performed by: OBSTETRICS & GYNECOLOGY

## 2022-08-31 PROCEDURE — 36000707: Performed by: OBSTETRICS & GYNECOLOGY

## 2022-08-31 RX ORDER — ONDANSETRON 2 MG/ML
INJECTION INTRAMUSCULAR; INTRAVENOUS
Status: DISCONTINUED | OUTPATIENT
Start: 2022-08-31 | End: 2022-08-31

## 2022-08-31 RX ORDER — ALBUTEROL SULFATE 0.83 MG/ML
2.5 SOLUTION RESPIRATORY (INHALATION) EVERY 4 HOURS PRN
Status: DISCONTINUED | OUTPATIENT
Start: 2022-08-31 | End: 2022-08-31 | Stop reason: HOSPADM

## 2022-08-31 RX ORDER — MEPERIDINE HYDROCHLORIDE 25 MG/ML
12.5 INJECTION INTRAMUSCULAR; INTRAVENOUS; SUBCUTANEOUS ONCE
Status: DISCONTINUED | OUTPATIENT
Start: 2022-08-31 | End: 2022-08-31 | Stop reason: HOSPADM

## 2022-08-31 RX ORDER — HYDROCODONE BITARTRATE AND ACETAMINOPHEN 5; 325 MG/1; MG/1
1 TABLET ORAL EVERY 4 HOURS PRN
Status: CANCELLED | OUTPATIENT
Start: 2022-08-31

## 2022-08-31 RX ORDER — MIDAZOLAM HYDROCHLORIDE 1 MG/ML
INJECTION, SOLUTION INTRAMUSCULAR; INTRAVENOUS
Status: DISCONTINUED | OUTPATIENT
Start: 2022-08-31 | End: 2022-08-31

## 2022-08-31 RX ORDER — OXYCODONE HYDROCHLORIDE 5 MG/1
5 TABLET ORAL
Status: DISCONTINUED | OUTPATIENT
Start: 2022-08-31 | End: 2022-08-31 | Stop reason: HOSPADM

## 2022-08-31 RX ORDER — DIPHENHYDRAMINE HYDROCHLORIDE 50 MG/ML
25 INJECTION INTRAMUSCULAR; INTRAVENOUS EVERY 4 HOURS PRN
Status: CANCELLED | OUTPATIENT
Start: 2022-08-31

## 2022-08-31 RX ORDER — SODIUM CHLORIDE, SODIUM LACTATE, POTASSIUM CHLORIDE, CALCIUM CHLORIDE 600; 310; 30; 20 MG/100ML; MG/100ML; MG/100ML; MG/100ML
INJECTION, SOLUTION INTRAVENOUS CONTINUOUS PRN
Status: DISCONTINUED | OUTPATIENT
Start: 2022-08-31 | End: 2022-08-31

## 2022-08-31 RX ORDER — SODIUM CHLORIDE 9 MG/ML
INJECTION, SOLUTION INTRAVENOUS CONTINUOUS
Status: DISCONTINUED | OUTPATIENT
Start: 2022-08-31 | End: 2022-08-31 | Stop reason: HOSPADM

## 2022-08-31 RX ORDER — SODIUM CHLORIDE 0.9 % (FLUSH) 0.9 %
3 SYRINGE (ML) INJECTION
Status: DISCONTINUED | OUTPATIENT
Start: 2022-08-31 | End: 2022-08-31 | Stop reason: HOSPADM

## 2022-08-31 RX ORDER — HYDROCODONE BITARTRATE AND ACETAMINOPHEN 5; 325 MG/1; MG/1
1 TABLET ORAL EVERY 4 HOURS PRN
Qty: 4 TABLET | Refills: 0 | Status: SHIPPED | OUTPATIENT
Start: 2022-08-31

## 2022-08-31 RX ORDER — ONDANSETRON 2 MG/ML
4 INJECTION INTRAMUSCULAR; INTRAVENOUS DAILY PRN
Status: DISCONTINUED | OUTPATIENT
Start: 2022-08-31 | End: 2022-08-31 | Stop reason: HOSPADM

## 2022-08-31 RX ORDER — PROPOFOL 10 MG/ML
VIAL (ML) INTRAVENOUS CONTINUOUS PRN
Status: DISCONTINUED | OUTPATIENT
Start: 2022-08-31 | End: 2022-08-31

## 2022-08-31 RX ORDER — PROCHLORPERAZINE EDISYLATE 5 MG/ML
5 INJECTION INTRAMUSCULAR; INTRAVENOUS EVERY 30 MIN PRN
Status: DISCONTINUED | OUTPATIENT
Start: 2022-08-31 | End: 2022-08-31 | Stop reason: HOSPADM

## 2022-08-31 RX ORDER — KETOROLAC TROMETHAMINE 30 MG/ML
INJECTION, SOLUTION INTRAMUSCULAR; INTRAVENOUS
Status: DISCONTINUED | OUTPATIENT
Start: 2022-08-31 | End: 2022-08-31

## 2022-08-31 RX ORDER — FENTANYL CITRATE 50 UG/ML
INJECTION, SOLUTION INTRAMUSCULAR; INTRAVENOUS
Status: DISCONTINUED | OUTPATIENT
Start: 2022-08-31 | End: 2022-08-31

## 2022-08-31 RX ORDER — ONDANSETRON 8 MG/1
8 TABLET, ORALLY DISINTEGRATING ORAL EVERY 8 HOURS PRN
Status: DISCONTINUED | OUTPATIENT
Start: 2022-08-31 | End: 2022-08-31 | Stop reason: HOSPADM

## 2022-08-31 RX ORDER — DIPHENHYDRAMINE HYDROCHLORIDE 50 MG/ML
25 INJECTION INTRAMUSCULAR; INTRAVENOUS EVERY 6 HOURS PRN
Status: DISCONTINUED | OUTPATIENT
Start: 2022-08-31 | End: 2022-08-31 | Stop reason: HOSPADM

## 2022-08-31 RX ORDER — DEXAMETHASONE SODIUM PHOSPHATE 4 MG/ML
INJECTION, SOLUTION INTRA-ARTICULAR; INTRALESIONAL; INTRAMUSCULAR; INTRAVENOUS; SOFT TISSUE
Status: DISCONTINUED | OUTPATIENT
Start: 2022-08-31 | End: 2022-08-31

## 2022-08-31 RX ORDER — HYDROMORPHONE HYDROCHLORIDE 2 MG/ML
0.2 INJECTION, SOLUTION INTRAMUSCULAR; INTRAVENOUS; SUBCUTANEOUS EVERY 5 MIN PRN
Status: DISCONTINUED | OUTPATIENT
Start: 2022-08-31 | End: 2022-08-31 | Stop reason: HOSPADM

## 2022-08-31 RX ORDER — KETOROLAC TROMETHAMINE 30 MG/ML
15 INJECTION, SOLUTION INTRAMUSCULAR; INTRAVENOUS EVERY 8 HOURS PRN
Status: DISCONTINUED | OUTPATIENT
Start: 2022-08-31 | End: 2022-08-31 | Stop reason: HOSPADM

## 2022-08-31 RX ORDER — LIDOCAINE HYDROCHLORIDE 10 MG/ML
INJECTION, SOLUTION EPIDURAL; INFILTRATION; INTRACAUDAL; PERINEURAL
Status: DISCONTINUED | OUTPATIENT
Start: 2022-08-31 | End: 2022-08-31

## 2022-08-31 RX ORDER — ACETAMINOPHEN 500 MG
1000 TABLET ORAL
Status: COMPLETED | OUTPATIENT
Start: 2022-08-31 | End: 2022-08-31

## 2022-08-31 RX ORDER — IBUPROFEN 800 MG/1
800 TABLET ORAL EVERY 8 HOURS PRN
Qty: 15 TABLET | Refills: 0 | Status: SHIPPED | OUTPATIENT
Start: 2022-08-31 | End: 2022-09-05

## 2022-08-31 RX ORDER — PROCHLORPERAZINE EDISYLATE 5 MG/ML
5 INJECTION INTRAMUSCULAR; INTRAVENOUS EVERY 6 HOURS PRN
Status: DISCONTINUED | OUTPATIENT
Start: 2022-08-31 | End: 2022-08-31 | Stop reason: HOSPADM

## 2022-08-31 RX ADMIN — ONDANSETRON 4 MG: 2 INJECTION, SOLUTION INTRAMUSCULAR; INTRAVENOUS at 08:08

## 2022-08-31 RX ADMIN — FENTANYL CITRATE 75 MCG: 50 INJECTION, SOLUTION INTRAMUSCULAR; INTRAVENOUS at 08:08

## 2022-08-31 RX ADMIN — SODIUM CHLORIDE, SODIUM LACTATE, POTASSIUM CHLORIDE, AND CALCIUM CHLORIDE: 600; 310; 30; 20 INJECTION, SOLUTION INTRAVENOUS at 08:08

## 2022-08-31 RX ADMIN — MIDAZOLAM 2 MG: 1 INJECTION INTRAMUSCULAR; INTRAVENOUS at 08:08

## 2022-08-31 RX ADMIN — FENTANYL CITRATE 25 MCG: 50 INJECTION, SOLUTION INTRAMUSCULAR; INTRAVENOUS at 08:08

## 2022-08-31 RX ADMIN — PROPOFOL 175 MCG/KG/MIN: 10 INJECTION, EMULSION INTRAVENOUS at 08:08

## 2022-08-31 RX ADMIN — ACETAMINOPHEN 1000 MG: 500 TABLET ORAL at 07:08

## 2022-08-31 RX ADMIN — DEXAMETHASONE SODIUM PHOSPHATE 4 MG: 4 INJECTION, SOLUTION INTRAMUSCULAR; INTRAVENOUS at 08:08

## 2022-08-31 RX ADMIN — KETOROLAC TROMETHAMINE 30 MG: 30 INJECTION, SOLUTION INTRAMUSCULAR at 08:08

## 2022-08-31 RX ADMIN — LIDOCAINE HYDROCHLORIDE 100 MG: 10 INJECTION, SOLUTION EPIDURAL; INFILTRATION; INTRACAUDAL; PERINEURAL at 08:08

## 2022-08-31 NOTE — OP NOTE
[facility] ([facility])  General Surgery  Operative Note    SUMMARY     Date of Procedure: 8/31/2022     Procedure: Procedure(s) (LRB):  HYSTEROSCOPY, WITH DILATION AND CURETTAGE OF UTERUS MYOSURE (N/A)       Surgeon(s) and Role:     * Mariah Babin MD - Primary    Assisting Surgeon: None    Pre-Operative Diagnosis: Post-menopausal bleeding [N95.0]    Post-Operative Diagnosis: Post-Op Diagnosis Codes:     * Post-menopausal bleeding [N95.0]    Anesthesia: Monitor Anesthesia Care    Operative Findings (including complications, if any): endometrial tissue present, 3 submucous fibroids; normal ostia    Description of Technical Procedures:   The patient was taken to the Operating Room where general       endotracheal anesthesia was induced and found to be adequate.  Her perineum was then prepped and draped in normal sterile fashion, and bladder was drained in an in and out fashion with the red rubber catheter.   Time out was performed.                                                    A side open speculum was  then placed in the patient's vagina, and the anterior lip of the cervix  was grasped with single-tooth tenaculum.  The uterine cervix was then gently  dilated up to 8mm using a Hegar dilator.  The uterus was sounded to 7cm.  A 0 degree hysteroscope was advanced through the cervix to the uterine fundus using normal saline as the distension medium.  The findings stated above were noted.  The myosure light  device was inserted through the hysteroscope, and under direct visualization the endocervical polyps were removed and the endometrial cavity sampled.    The endometrial curettings were sent to pathology for permanent section.    The patient had no bleeding at the end of the case. The single-tooth tenaculum was removed from the anterior lip of the cervix and excellent hemostasis was noted.  The speculum was then removed.   Sponge, lap and needle counts were correct  X 2.  She will go to Recovery in stable  condition.    Fluid deficit at the end of case was noted to be 40cc.     Significant Surgical Tasks Conducted by the Assistant(s), if Applicable: n/a    Estimated Blood Loss (EBL): * No values recorded between 8/31/2022  8:22 AM and 8/31/2022  8:52 AM *20           Implants: * No implants in log *    Specimens:   Specimen (24h ago, onward)       Start     Ordered    08/31/22 0827  Specimen to Pathology, Surgery Gynecology and Obstetrics  Once        Comments: Pre-op Diagnosis: Post-menopausal bleeding [N95.0]Procedure(s):HYSTEROSCOPY, WITH DILATION AND CURETTAGE OF UTERUS MYOSURE Number of specimens: 1Name of specimens: 1. Endometrial curretings (perm)     References:    Click here for ordering Quick Tip   Question Answer Comment   Procedure Type: Gynecology and Obstetrics    Specimen Class: Routine/Screening    Which provider would you like to cc? LAST HERNANDEZ    Release to patient Immediate        08/31/22 0827                            Condition: Good    Disposition: PACU - hemodynamically stable.    Attestation: I performed the procedure.

## 2022-08-31 NOTE — TRANSFER OF CARE
"Anesthesia Transfer of Care Note    Patient: Beena Potter    Procedure(s) Performed: Procedure(s) (LRB):  HYSTEROSCOPY, WITH DILATION AND CURETTAGE OF UTERUS MYOSURE (N/A)    Patient location: PACU    Anesthesia Type: MAC    Transport from OR: Transported from OR on room air with adequate spontaneous ventilation    Post pain: adequate analgesia    Post assessment: no apparent anesthetic complications and tolerated procedure well    Post vital signs: stable    Level of consciousness: awake, responds to stimulation and alert    Nausea/Vomiting: no nausea/vomiting    Complications: none    Transfer of care protocol was followed      Last vitals:   Visit Vitals  BP (!) 167/72 (BP Location: Right arm, Patient Position: Lying)   Pulse 89   Temp 36.5 °C (97.7 °F) (Skin)   Resp 16   Ht 5' 4" (1.626 m)   Wt 96.3 kg (212 lb 4.9 oz)   LMP 12/19/2014 (Approximate)   SpO2 97%   Breastfeeding No   BMI 36.44 kg/m²     "

## 2022-08-31 NOTE — SUBJECTIVE & OBJECTIVE
OB History    Para Term  AB Living   2 2 0 0 0 2   SAB IAB Ectopic Multiple Live Births   0 0 0 0 2      # Outcome Date GA Lbr Iam/2nd Weight Sex Delivery Anes PTL Lv   2 Para      Vag-Spont   EDU   1 Para      Vag-Spont   EDU     Past Medical History:   Diagnosis Date    Asthma     Colon cancer 2013    radiation and chemo    Rectal cancer 2012     Past Surgical History:   Procedure Laterality Date    colon cancer  2012    FOOT SURGERY Right        PTA Medications   Medication Sig    fluticasone/umeclidin/vilanter (TRELEGY ELLIPTA INHL) Inhale into the lungs once daily at 6am.    montelukast (SINGULAIR) 10 mg tablet Take 10 mg by mouth once daily.    progesterone (PROMETRIUM) 200 MG capsule TAKE 1 CAPSULE BY MOUTH NIGHTLY AT BEDTIME    spironolactone (ALDACTONE) 25 MG tablet Take 25 mg by mouth 2 (two) times daily.    testosterone 100 mg pellet Inject 100 mg into the muscle. Every 12-14 weeks    estradiol 50 mg pellet Inject 50 mg into the muscle. Every 12-14 weeks    fluticasone furoate-vilanteroL (BREO) 200-25 mcg/dose DsDv diskus inhaler     mirabegron (MYRBETRIQ) 25 mg Tb24 ER tablet Take 1 tablet (25 mg total) by mouth once daily.       Review of patient's allergies indicates:   Allergen Reactions    Scopolamine Rash        Family History       Problem Relation (Age of Onset)    Hypertension Father, Mother          Tobacco Use    Smoking status: Never    Smokeless tobacco: Never   Substance and Sexual Activity    Alcohol use: No    Drug use: No    Sexual activity: Yes     Partners: Male     Birth control/protection: None     Review of Systems   Genitourinary:  Positive for postmenopausal bleeding.   All other systems reviewed and are negative.   Objective:     Vital Signs (Most Recent):  Temp: 98.1 °F (36.7 °C) (22)  Pulse: 89 (22)  Resp: 18 (22)  BP: (!) 178/86 (22)  SpO2: 97 % (22)   Vital Signs (24h Range):  Temp:  [98.1 °F  (36.7 °C)] 98.1 °F (36.7 °C)  Pulse:  [89] 89  Resp:  [18] 18  SpO2:  [97 %] 97 %  BP: (178)/(86) 178/86     Weight: 96.3 kg (212 lb 4.9 oz)  Body mass index is 36.44 kg/m².    Patient's last menstrual period was 12/19/2014 (approximate).    Physical Exam:   Constitutional: She appears well-developed.     Eyes: Pupils are equal, round, and reactive to light. Conjunctivae and EOM are normal.      Pulmonary/Chest: Effort normal.        Abdominal: Soft.             Musculoskeletal: Normal range of motion.       Neurological: She is alert.    Skin: Skin is warm.    Psychiatric: She has a normal mood and affect.     Laboratory:  Recent Lab Results         08/31/22  0734        Preg Test, Ur Negative        Acceptable Yes             I have personallly reviewed all pertinent lab results from the last 24 hours.    Diagnostic Results:  Labs: Reviewed

## 2022-08-31 NOTE — SUBJECTIVE & OBJECTIVE
OB History    Para Term  AB Living   2 2 0 0 0 2   SAB IAB Ectopic Multiple Live Births   0 0 0 0 2      # Outcome Date GA Lbr Iam/2nd Weight Sex Delivery Anes PTL Lv   2 Para      Vag-Spont   EDU   1 Para      Vag-Spont   EDU     Past Medical History:   Diagnosis Date    Asthma     Colon cancer 2013    radiation and chemo    Rectal cancer 2012     Past Surgical History:   Procedure Laterality Date    colon cancer  2012    FOOT SURGERY Right        PTA Medications   Medication Sig    fluticasone/umeclidin/vilanter (TRELEGY ELLIPTA INHL) Inhale into the lungs once daily at 6am.    montelukast (SINGULAIR) 10 mg tablet Take 10 mg by mouth once daily.    progesterone (PROMETRIUM) 200 MG capsule TAKE 1 CAPSULE BY MOUTH NIGHTLY AT BEDTIME    spironolactone (ALDACTONE) 25 MG tablet Take 25 mg by mouth 2 (two) times daily.    testosterone 100 mg pellet Inject 100 mg into the muscle. Every 12-14 weeks    estradiol 50 mg pellet Inject 50 mg into the muscle. Every 12-14 weeks    fluticasone furoate-vilanteroL (BREO) 200-25 mcg/dose DsDv diskus inhaler     mirabegron (MYRBETRIQ) 25 mg Tb24 ER tablet Take 1 tablet (25 mg total) by mouth once daily.       Review of patient's allergies indicates:   Allergen Reactions    Scopolamine Rash        Family History       Problem Relation (Age of Onset)    Hypertension Father, Mother          Tobacco Use    Smoking status: Never    Smokeless tobacco: Never   Substance and Sexual Activity    Alcohol use: No    Drug use: No    Sexual activity: Yes     Partners: Male     Birth control/protection: None     Review of Systems   Genitourinary:  Positive for postmenopausal bleeding.   All other systems reviewed and are negative.   Objective:     Vital Signs (Most Recent):  Temp: 98.1 °F (36.7 °C) (22)  Pulse: 89 (22)  Resp: 18 (22)  BP: (!) 178/86 (22)  SpO2: 97 % (22)   Vital Signs (24h Range):  Temp:  [98.1 °F  (36.7 °C)] 98.1 °F (36.7 °C)  Pulse:  [89] 89  Resp:  [18] 18  SpO2:  [97 %] 97 %  BP: (178)/(86) 178/86     Weight: 96.3 kg (212 lb 4.9 oz)  Body mass index is 36.44 kg/m².    Patient's last menstrual period was 12/19/2014 (approximate).    Physical Exam:   Constitutional: She appears well-developed.     Eyes: Pupils are equal, round, and reactive to light. Conjunctivae and EOM are normal.      Pulmonary/Chest: Effort normal.        Abdominal: Soft.             Musculoskeletal: Normal range of motion.       Neurological: She is alert.    Skin: Skin is warm.    Psychiatric: She has a normal mood and affect.     Laboratory:  Recent Lab Results         08/31/22  0734        Preg Test, Ur Negative        Acceptable Yes             I have personallly reviewed all pertinent lab results from the last 24 hours.    Diagnostic Results:  US: Reviewed

## 2022-08-31 NOTE — BRIEF OP NOTE
[facility] (Hospital)  Brief Operative Note    Surgery Date: 8/31/2022     Surgeon(s) and Role:     * Last Weiss MD - Primary    Assisting Surgeon: None    Pre-op Diagnosis:  Post-menopausal bleeding [N95.0]    Post-op Diagnosis:  Post-Op Diagnosis Codes:     * Post-menopausal bleeding [N95.0]    Procedure(s) (LRB):  HYSTEROSCOPY, WITH DILATION AND CURETTAGE OF UTERUS MYOSURE (N/A)    Anesthesia: Monitor Anesthesia Care    Operative Findings: endometrial tissue present ; small submucous fibroids present, normal ostia    Estimated Blood Loss: * No values recorded between 8/31/2022  8:22 AM and 8/31/2022  8:52 AM *20cc         Specimens:   Specimen (24h ago, onward)       Start     Ordered    08/31/22 0827  Specimen to Pathology, Surgery Gynecology and Obstetrics  Once        Comments: Pre-op Diagnosis: Post-menopausal bleeding [N95.0]Procedure(s):HYSTEROSCOPY, WITH DILATION AND CURETTAGE OF UTERUS MYOSURE Number of specimens: 1Name of specimens: 1. Endometrial curretings (perm)     References:    Click here for ordering Quick Tip   Question Answer Comment   Procedure Type: Gynecology and Obstetrics    Specimen Class: Routine/Screening    Which provider would you like to cc? LAST WEISS    Release to patient Immediate        08/31/22 0839                      Discharge Note    OUTCOME: Patient tolerated treatment/procedure well without complication and is now ready for discharge.    DISPOSITION: Home or Self Care    FINAL DIAGNOSIS:  Status post hysteroscopy with endometrial resection    FOLLOWUP: In clinic. Dr weiss ([facility])    DISCHARGE INSTRUCTIONS:    Discharge Procedure Orders   Diet general     Pelvic Rest   Order Comments: X 2 wks --no tampons , intercourse, douching     No dressing needed     Call MD for:  temperature >100.4     Call MD for:  persistent nausea and vomiting     Call MD for:  severe uncontrolled pain     Call MD for:  difficulty breathing, headache or visual disturbances

## 2022-08-31 NOTE — H&P
'[facility] ([facility])  Obstetrics & Gynecology  History & Physical    Patient Name: Beena Potter  MRN: [id number]  Admission Date: 2022  Primary Care Provider: Primary Doctor No    Subjective:     Chief Complaint/Reason for Admission: postmenopausal bleeding    History of Present Illness:  Patient is a 56 y.o. female presents with postmenopausal bleeding for past 2 wks; reports menopausal since xrt in .  Currently using estrogen/testosterone pellets and nightly prometrium (200mg  Added additional prometrium 100mg bid .  Had informal sono showing polyps.  Wishes to proceed with hysteroscopy with d&c/myosure             OB History    Para Term  AB Living   2 2 0 0 0 2   SAB IAB Ectopic Multiple Live Births   0 0 0 0 2      # Outcome Date GA Lbr Iam/2nd Weight Sex Delivery Anes PTL Lv   2 Para      Vag-Spont   EDU   1 Para      Vag-Spont   EDU     Past Medical History:   Diagnosis Date    Asthma     Colon cancer     radiation and chemo    Rectal cancer 2012     Past Surgical History:   Procedure Laterality Date    colon cancer  2012    FOOT SURGERY Right        PTA Medications   Medication Sig    fluticasone/umeclidin/vilanter (TRELEGY ELLIPTA INHL) Inhale into the lungs once daily at 6am.    montelukast (SINGULAIR) 10 mg tablet Take 10 mg by mouth once daily.    progesterone (PROMETRIUM) 200 MG capsule TAKE 1 CAPSULE BY MOUTH NIGHTLY AT BEDTIME    spironolactone (ALDACTONE) 25 MG tablet Take 25 mg by mouth 2 (two) times daily.    testosterone 100 mg pellet Inject 100 mg into the muscle. Every 12-14 weeks    estradiol 50 mg pellet Inject 50 mg into the muscle. Every 12-14 weeks    fluticasone furoate-vilanteroL (BREO) 200-25 mcg/dose DsDv diskus inhaler     mirabegron (MYRBETRIQ) 25 mg Tb24 ER tablet Take 1 tablet (25 mg total) by mouth once daily.       Review of patient's allergies indicates:   Allergen Reactions    Scopolamine Rash        Family History        Problem Relation (Age of Onset)    Hypertension Father, Mother          Tobacco Use    Smoking status: Never    Smokeless tobacco: Never   Substance and Sexual Activity    Alcohol use: No    Drug use: No    Sexual activity: Yes     Partners: Male     Birth control/protection: None     Review of Systems   Genitourinary:  Positive for postmenopausal bleeding.   All other systems reviewed and are negative.   Objective:     Vital Signs (Most Recent):  Temp: 98.1 °F (36.7 °C) (08/31/22 0724)  Pulse: 89 (08/31/22 0724)  Resp: 18 (08/31/22 0724)  BP: (!) 178/86 (08/31/22 0724)  SpO2: 97 % (08/31/22 0724)   Vital Signs (24h Range):  Temp:  [98.1 °F (36.7 °C)] 98.1 °F (36.7 °C)  Pulse:  [89] 89  Resp:  [18] 18  SpO2:  [97 %] 97 %  BP: (178)/(86) 178/86     Weight: 96.3 kg (212 lb 4.9 oz)  Body mass index is 36.44 kg/m².    Patient's last menstrual period was 12/19/2014 (approximate).    Physical Exam:   Constitutional: She appears well-developed.     Eyes: Pupils are equal, round, and reactive to light. Conjunctivae and EOM are normal.      Pulmonary/Chest: Effort normal.        Abdominal: Soft.             Musculoskeletal: Normal range of motion.       Neurological: She is alert.    Skin: Skin is warm.    Psychiatric: She has a normal mood and affect.     Laboratory:  Recent Lab Results         08/31/22  0734        Preg Test, Ur Negative        Acceptable Yes             I have personallly reviewed all pertinent lab results from the last 24 hours.    Diagnostic Results:  Labs: Reviewed    Assessment/Plan:     No new Assessment & Plan notes have been filed under this hospital service since the last note was generated.  Service: Obstetrics and Gynecology      Mariah Babin MD  Obstetrics & Gynecology  [facility] - [facility] ([facility])

## 2022-08-31 NOTE — ASSESSMENT & PLAN NOTE
08/31/2022  Previously Reviewed hysteroscopy with myosure procedure in detail.  Reviewed risks including but not limited to infection, bleeding, damage to bowel/bladder, cva;htn, dvt, death.  All questions answered to the best of my ability.  Alternatives reviewed --continued observation, office emb, hystectomy.  Pt wishes to proceed with hysteroscopy with myosure.  Consents signed witnessed  Post op course reviewed

## 2022-08-31 NOTE — HPI
Patient is a 56 y.o. female presents with postmenopausal bleeding for past 2 wks; reports menopausal since xrt in 2014.  Currently using estrogen/testosterone pellets and nightly prometrium (200mg  Added additional prometrium 100mg bid .  Had informal sono showing polyps.  Wishes to proceed with hysteroscopy with d&c/myosure

## 2022-08-31 NOTE — H&P
'[facility] ([facility])  Obstetrics & Gynecology  History & Physical    Patient Name: Beena Potter  MRN: [id number]  Admission Date: 2022  Primary Care Provider: Primary Doctor No    Subjective:     Chief Complaint/Reason for Admission: postmenopausal bleeding    History of Present Illness:  Patient is a 56 y.o. female presents with postmenopausal bleeding for past 2 wks; reports menopausal since xrt in .  Currently using estrogen/testosterone pellets and nightly prometrium (200mg  Added additional prometrium 100mg bid .  Had informal sono showing polyps.  Wishes to proceed with hysteroscopy with d&c/myosure             OB History    Para Term  AB Living   2 2 0 0 0 2   SAB IAB Ectopic Multiple Live Births   0 0 0 0 2      # Outcome Date GA Lbr Iam/2nd Weight Sex Delivery Anes PTL Lv   2 Para      Vag-Spont   EDU   1 Para      Vag-Spont   EDU     Past Medical History:   Diagnosis Date    Asthma     Colon cancer     radiation and chemo    Rectal cancer 2012     Past Surgical History:   Procedure Laterality Date    colon cancer  2012    FOOT SURGERY Right        PTA Medications   Medication Sig    fluticasone/umeclidin/vilanter (TRELEGY ELLIPTA INHL) Inhale into the lungs once daily at 6am.    montelukast (SINGULAIR) 10 mg tablet Take 10 mg by mouth once daily.    progesterone (PROMETRIUM) 200 MG capsule TAKE 1 CAPSULE BY MOUTH NIGHTLY AT BEDTIME    spironolactone (ALDACTONE) 25 MG tablet Take 25 mg by mouth 2 (two) times daily.    testosterone 100 mg pellet Inject 100 mg into the muscle. Every 12-14 weeks    estradiol 50 mg pellet Inject 50 mg into the muscle. Every 12-14 weeks    fluticasone furoate-vilanteroL (BREO) 200-25 mcg/dose DsDv diskus inhaler     mirabegron (MYRBETRIQ) 25 mg Tb24 ER tablet Take 1 tablet (25 mg total) by mouth once daily.       Review of patient's allergies indicates:   Allergen Reactions    Scopolamine Rash        Family History        Problem Relation (Age of Onset)    Hypertension Father, Mother          Tobacco Use    Smoking status: Never    Smokeless tobacco: Never   Substance and Sexual Activity    Alcohol use: No    Drug use: No    Sexual activity: Yes     Partners: Male     Birth control/protection: None     Review of Systems   Genitourinary:  Positive for postmenopausal bleeding.   All other systems reviewed and are negative.   Objective:     Vital Signs (Most Recent):  Temp: 98.1 °F (36.7 °C) (08/31/22 0724)  Pulse: 89 (08/31/22 0724)  Resp: 18 (08/31/22 0724)  BP: (!) 178/86 (08/31/22 0724)  SpO2: 97 % (08/31/22 0724)   Vital Signs (24h Range):  Temp:  [98.1 °F (36.7 °C)] 98.1 °F (36.7 °C)  Pulse:  [89] 89  Resp:  [18] 18  SpO2:  [97 %] 97 %  BP: (178)/(86) 178/86     Weight: 96.3 kg (212 lb 4.9 oz)  Body mass index is 36.44 kg/m².    Patient's last menstrual period was 12/19/2014 (approximate).    Physical Exam:   Constitutional: She appears well-developed.     Eyes: Pupils are equal, round, and reactive to light. Conjunctivae and EOM are normal.      Pulmonary/Chest: Effort normal.        Abdominal: Soft.             Musculoskeletal: Normal range of motion.       Neurological: She is alert.    Skin: Skin is warm.    Psychiatric: She has a normal mood and affect.     Laboratory:  Recent Lab Results         08/31/22  0734        Preg Test, Ur Negative        Acceptable Yes             I have personallly reviewed all pertinent lab results from the last 24 hours.    Diagnostic Results:  US: Reviewed    Assessment/Plan:     * Postmenopausal bleeding  08/31/2022  Previously Reviewed hysteroscopy with myosure procedure in detail.  Reviewed risks including but not limited to infection, bleeding, damage to bowel/bladder, cva;htn, dvt, death.  All questions answered to the best of my ability.  Alternatives reviewed --continued observation, office emb, hystectomy.  Pt wishes to proceed with hysteroscopy with myosure.   Consents signed witnessed  Post op course reviewed          Mariah Babin MD  Obstetrics & Gynecology  [facility] ([facility])

## 2022-08-31 NOTE — ANESTHESIA POSTPROCEDURE EVALUATION
Anesthesia Post Evaluation    Patient: Beena Potter    Procedure(s) Performed: Procedure(s) (LRB):  HYSTEROSCOPY, WITH DILATION AND CURETTAGE OF UTERUS MYOSURE (N/A)    Final Anesthesia Type: MAC      Patient location during evaluation: PACU  Patient participation: Yes- Able to Participate  Level of consciousness: awake  Post-procedure vital signs: reviewed and stable  Pain management: adequate  Airway patency: patent    PONV status at discharge: No PONV  Anesthetic complications: no      Cardiovascular status: hemodynamically stable  Respiratory status: unassisted  Hydration status: euvolemic  Follow-up not needed.          Vitals Value Taken Time   /64 08/31/22 0930   Temp 36.5 °C (97.7 °F) 08/31/22 0853   Pulse 80 08/31/22 0934   Resp 30 08/31/22 0933   SpO2 95 % 08/31/22 0934   Vitals shown include unvalidated device data.      Event Time   Out of Recovery 09:37:09         Pain/Michelle Score: Pain Rating Prior to Med Admin: 0 (8/31/2022  7:12 AM)  Michelle Score: 10 (8/31/2022  9:30 AM)

## 2022-08-31 NOTE — ANESTHESIA PREPROCEDURE EVALUATION
08/30/2022  Beena Potter is a 57 y.o., female.    Patient Active Problem List   Diagnosis    Malignant neoplasm of rectum    Incisional hernia    History of iron deficiency anemia    Seasonal allergic rhinitis due to pollen    Asthma, well controlled     Pre-op Assessment    I have reviewed the Patient Summary Reports.    I have reviewed the NPO Status.   I have reviewed the Medications.     Review of Systems  Anesthesia Hx:  No problems with previous Anesthesia  Denies Family Hx of Anesthesia complications.   Denies Personal Hx of Anesthesia complications.   Hematology/Oncology:  Hematology Normal       -- Cancer in past history:  Other (see Oncology comments) surgery  Oncology Comments: Rectal cancer     EENT/Dental:EENT/Dental Normal   Cardiovascular:  Cardiovascular Normal  ECG has been reviewed.    Pulmonary:   Asthma    Renal/:   Postmenopausal bleeding   Hepatic/GI:  Hepatic/GI Normal    Musculoskeletal:  Musculoskeletal Normal    Neurological:  Neurology Normal    Endocrine:  Endocrine Normal    Dermatological:  Skin Normal    Psych:  Psychiatric Normal           Physical Exam  General: Alert and Oriented    Airway:  Mallampati: II   Mouth Opening: Normal  TM Distance: Normal  Tongue: Normal  Neck ROM: Normal ROM        Anesthesia Plan  Type of Anesthesia, risks & benefits discussed:    Anesthesia Type: Gen ETT, Gen Supraglottic Airway, MAC  Intra-op Monitoring Plan: Standard ASA Monitors  ASA Score: 2  Day of Surgery Review of History & Physical: I have interviewed and examined the patient. I have reviewed the patient's H&P dated:   Anesthesia Plan Notes: PATIENT PREFERS MAC IF POSSIBLE    Ready For Surgery From Anesthesia Perspective.     .

## 2022-09-01 VITALS
DIASTOLIC BLOOD PRESSURE: 64 MMHG | TEMPERATURE: 98 F | RESPIRATION RATE: 14 BRPM | HEIGHT: 64 IN | BODY MASS INDEX: 36.25 KG/M2 | OXYGEN SATURATION: 96 % | HEART RATE: 75 BPM | SYSTOLIC BLOOD PRESSURE: 147 MMHG | WEIGHT: 212.31 LBS

## 2022-09-06 ENCOUNTER — TELEPHONE (OUTPATIENT)
Dept: OBSTETRICS AND GYNECOLOGY | Facility: CLINIC | Age: 57
End: 2022-09-06
Payer: OTHER GOVERNMENT

## 2022-09-06 LAB
FINAL PATHOLOGIC DIAGNOSIS: NORMAL
GROSS: NORMAL
Lab: NORMAL

## 2022-09-06 NOTE — TELEPHONE ENCOUNTER
Spoke with pt, appt sent out for rs on 9/19/22 @ 1pm in [place], pt voiced understanding.     ----- Message from Ange Saldivar sent at 9/6/2022  2:33 PM CDT -----  Contact: self/[phone]  Patient is calling to reschedule  her post op appt. Please call her back at [phone]. Thanks/ar

## 2022-09-19 ENCOUNTER — OFFICE VISIT (OUTPATIENT)
Dept: OBSTETRICS AND GYNECOLOGY | Facility: CLINIC | Age: 57
End: 2022-09-19
Payer: OTHER GOVERNMENT

## 2022-09-19 VITALS
SYSTOLIC BLOOD PRESSURE: 136 MMHG | WEIGHT: 226.88 LBS | BODY MASS INDEX: 38.73 KG/M2 | DIASTOLIC BLOOD PRESSURE: 78 MMHG | HEIGHT: 64 IN

## 2022-09-19 DIAGNOSIS — N39.3 STRESS INCONTINENCE OF URINE: ICD-10-CM

## 2022-09-19 DIAGNOSIS — N95.0 POST-MENOPAUSAL BLEEDING: ICD-10-CM

## 2022-09-19 DIAGNOSIS — Z98.890 STATUS POST HYSTEROSCOPY: Primary | ICD-10-CM

## 2022-09-19 PROCEDURE — 99024 POSTOP FOLLOW-UP VISIT: CPT | Mod: ,,, | Performed by: OBSTETRICS & GYNECOLOGY

## 2022-09-19 PROCEDURE — 99999 PR PBB SHADOW E&M-EST. PATIENT-LVL III: CPT | Mod: PBBFAC,,, | Performed by: OBSTETRICS & GYNECOLOGY

## 2022-09-19 PROCEDURE — 99999 PR PBB SHADOW E&M-EST. PATIENT-LVL III: ICD-10-PCS | Mod: PBBFAC,,, | Performed by: OBSTETRICS & GYNECOLOGY

## 2022-09-19 PROCEDURE — 99213 OFFICE O/P EST LOW 20 MIN: CPT | Mod: PBBFAC,PN | Performed by: OBSTETRICS & GYNECOLOGY

## 2022-09-19 PROCEDURE — 99024 PR POST-OP FOLLOW-UP VISIT: ICD-10-PCS | Mod: ,,, | Performed by: OBSTETRICS & GYNECOLOGY

## 2022-09-19 RX ORDER — METFORMIN HYDROCHLORIDE 500 MG/1
TABLET, EXTENDED RELEASE ORAL
COMMUNITY
Start: 2022-03-30

## 2022-09-19 RX ORDER — ALBUTEROL SULFATE 0.83 MG/ML
SOLUTION RESPIRATORY (INHALATION)
COMMUNITY
Start: 2022-06-03

## 2022-09-19 RX ORDER — PREDNISONE 20 MG/1
20 TABLET ORAL DAILY
COMMUNITY
Start: 2022-09-01

## 2022-09-19 RX ORDER — SODIUM CHLORIDE FOR INHALATION 0.9 %
VIAL, NEBULIZER (ML) INHALATION 3 TIMES DAILY
COMMUNITY
Start: 2022-09-13

## 2022-09-19 NOTE — PROGRESS NOTES
Subjective:       Patient ID: Beena Potter is a 57 y.o. female.    Chief Complaint:  No chief complaint on file.      History of Present Illness  HPI  Postoperative Follow-up  Patient presents to the clinic 2 weeks status post operative hysteroscopy for  post menopausal bleeding . Eating a regular diet without difficulty. Bowel movements are normal. The patient is not having any pain.  Had minimal spotting for 2 days after surgery  No cramping; pelvic pain  GYN & OB History  Patient's last menstrual period was 2014 (approximate).   Date of Last Pap: 2022    OB History    Para Term  AB Living   2 2 0     2   SAB IAB Ectopic Multiple Live Births           2      # Outcome Date GA Lbr Iam/2nd Weight Sex Delivery Anes PTL Lv   2 Para      Vag-Spont   EDU   1 Para      Vag-Spont   EDU       Review of Systems  Review of Systems   All other systems reviewed and are negative.        Objective:      Physical Exam:   Constitutional: She is oriented to person, place, and time. She appears well-developed and well-nourished.     Eyes: Pupils are equal, round, and reactive to light. Conjunctivae and EOM are normal.      Pulmonary/Chest: Effort normal. Right breast exhibits no mass, no nipple discharge, no skin change, no tenderness and presence. Left breast exhibits no mass, no nipple discharge, no skin change, no tenderness and presence. Breasts are symmetrical.        Abdominal: Soft.     Genitourinary:    Vagina, uterus, right adnexa, left adnexa and rectum normal.      Pelvic exam was performed with patient supine.   The external female genitalia was normal.   Labial bartholins normal.Cervix is normal. Right adnexum displays no mass and no tenderness. Left adnexum displays no mass and no tenderness. No erythema,  no vaginal discharge, bleeding, rectocele, cystocele or unspecified prolapse of vaginal walls in the vagina. Vagina was moist.Uerus contour normal  Normal urethral meatus.Urethra  findings: no urethral massBladder findings: no bladder distention and no bladder tenderness          Musculoskeletal: Normal range of motion and moves all extremeties.       Neurological: She is alert and oriented to person, place, and time.    Skin: Skin is warm.    Psychiatric: She has a normal mood and affect. Her behavior is normal.         Assessment:        1. Status post hysteroscopy with endometrial resection    2. Post-menopausal bleeding    3. Stress incontinence of urine               Plan:      Surgery findings/path report reviewed  Released from gyn care  PA to [facility] pharmacy for myrbetriq          Cosentyx Counseling:  I discussed with the patient the risks of Cosentyx including but not limited to worsening of Crohn's disease, immunosuppression, allergic reactions and infections.  The patient understands that monitoring is required including a PPD at baseline and must alert us or the primary physician if symptoms of infection or other concerning signs are noted.

## 2023-05-11 ENCOUNTER — TELEPHONE (OUTPATIENT)
Dept: OBSTETRICS AND GYNECOLOGY | Facility: CLINIC | Age: 58
End: 2023-05-11
Payer: OTHER GOVERNMENT

## 2023-05-11 DIAGNOSIS — Z12.31 ENCOUNTER FOR SCREENING MAMMOGRAM FOR MALIGNANT NEOPLASM OF BREAST: Primary | ICD-10-CM

## 2023-05-11 NOTE — TELEPHONE ENCOUNTER
----- Message from Carmencita Ruggiero sent at 5/11/2023 12:54 PM CDT -----  Contact: Beena Hinds is calling to speak with the nurse regarding orders. Reports needing mamogram orders sent. Please give patient a call back at [phone]

## 2023-05-12 ENCOUNTER — HOSPITAL ENCOUNTER (OUTPATIENT)
Dept: RADIOLOGY | Facility: HOSPITAL | Age: 58
Discharge: HOME | End: 2023-05-12
Attending: OBSTETRICS & GYNECOLOGY
Payer: OTHER GOVERNMENT

## 2023-05-12 DIAGNOSIS — Z12.31 ENCOUNTER FOR SCREENING MAMMOGRAM FOR MALIGNANT NEOPLASM OF BREAST: ICD-10-CM

## 2023-05-12 PROCEDURE — 77067 SCR MAMMO BI INCL CAD: CPT | Mod: 26,,, | Performed by: RADIOLOGY

## 2023-05-12 PROCEDURE — 77063 BREAST TOMOSYNTHESIS BI: CPT | Mod: 26,,, | Performed by: RADIOLOGY

## 2023-05-12 PROCEDURE — 77067 SCR MAMMO BI INCL CAD: CPT | Mod: TC

## 2023-05-12 PROCEDURE — 77063 MAMMO DIGITAL SCREENING BILAT WITH TOMO: ICD-10-PCS | Mod: 26,,, | Performed by: RADIOLOGY

## 2023-05-12 PROCEDURE — 77067 MAMMO DIGITAL SCREENING BILAT WITH TOMO: ICD-10-PCS | Mod: 26,,, | Performed by: RADIOLOGY

## 2023-05-15 NOTE — PROGRESS NOTES
I am happy to report that your recent breast imaging did NOT show evidence of cancer. An annual mammogram is the best test to screen for breast cancer, but it is not perfect, and it can miss some cancers. So, even though your mammogram was normal, if you notice any lump or change in one of your breasts, please schedule an appointment with me for a proper evaluation. Thank you for letting me care for you. I look forward to seeing you again. Sincerely, Dr. Mariah Babin

## 2024-05-13 ENCOUNTER — HOSPITAL ENCOUNTER (OUTPATIENT)
Dept: RADIOLOGY | Facility: HOSPITAL | Age: 59
Discharge: HOME | End: 2024-05-13
Attending: OBSTETRICS & GYNECOLOGY
Payer: OTHER GOVERNMENT

## 2024-05-13 DIAGNOSIS — Z12.31 ENCOUNTER FOR SCREENING MAMMOGRAM FOR BREAST CANCER: ICD-10-CM

## 2024-05-13 PROCEDURE — 77067 SCR MAMMO BI INCL CAD: CPT | Mod: TC

## 2024-05-13 PROCEDURE — 77067 SCR MAMMO BI INCL CAD: CPT | Mod: 26,,, | Performed by: RADIOLOGY

## 2024-05-13 PROCEDURE — 77063 BREAST TOMOSYNTHESIS BI: CPT | Mod: 26,,, | Performed by: RADIOLOGY

## 2025-03-19 ENCOUNTER — TELEPHONE (OUTPATIENT)
Dept: ORTHOPEDICS | Facility: CLINIC | Age: 60
End: 2025-03-19
Payer: OTHER GOVERNMENT

## 2025-03-19 NOTE — TELEPHONE ENCOUNTER
Patient has a upcoming appointment on 04/04/2025 with  However he will be out of office that day. Patient did not answer the phone and brief voicemail was left to return call to reschedule appointment

## 2025-03-19 NOTE — TELEPHONE ENCOUNTER
----- Message from Shamika sent at 3/19/2025  2:22 PM CDT -----  Contact: Beena  .Type:  Patient Returning CallWho Called: Beena Who Left Message for Patient: renaldo Does the patient know what this is regarding?: Rescheduling canceled appt Would the patient rather a call back or a response via [facility]?  callBest Call Back Number: .[phone] Additional Information:

## 2025-03-27 ENCOUNTER — OFFICE VISIT (OUTPATIENT)
Dept: INTERNAL MEDICINE | Facility: CLINIC | Age: 60
End: 2025-03-27
Payer: OTHER GOVERNMENT

## 2025-03-27 VITALS
SYSTOLIC BLOOD PRESSURE: 138 MMHG | HEIGHT: 64 IN | WEIGHT: 194 LBS | DIASTOLIC BLOOD PRESSURE: 78 MMHG | TEMPERATURE: 98 F | BODY MASS INDEX: 33.12 KG/M2 | OXYGEN SATURATION: 98 %

## 2025-03-27 DIAGNOSIS — R73.03 PREDIABETES: ICD-10-CM

## 2025-03-27 DIAGNOSIS — Z85.048 HISTORY OF RECTAL CANCER: ICD-10-CM

## 2025-03-27 DIAGNOSIS — R10.11 RUQ PAIN: ICD-10-CM

## 2025-03-27 DIAGNOSIS — M25.561 RIGHT KNEE PAIN, UNSPECIFIED CHRONICITY: Primary | ICD-10-CM

## 2025-03-27 DIAGNOSIS — K80.20 CALCULUS OF GALLBLADDER WITHOUT CHOLECYSTITIS WITHOUT OBSTRUCTION: ICD-10-CM

## 2025-03-27 DIAGNOSIS — Z00.00 ROUTINE ADULT HEALTH MAINTENANCE: Primary | ICD-10-CM

## 2025-03-27 DIAGNOSIS — J45.909 ASTHMA, WELL CONTROLLED, UNSPECIFIED ASTHMA SEVERITY, UNSPECIFIED WHETHER PERSISTENT: ICD-10-CM

## 2025-03-27 PROCEDURE — 99999 PR PBB SHADOW E&M-EST. PATIENT-LVL IV: CPT | Mod: PBBFAC,,, | Performed by: FAMILY MEDICINE

## 2025-03-27 PROCEDURE — 99214 OFFICE O/P EST MOD 30 MIN: CPT | Mod: PBBFAC,PO | Performed by: FAMILY MEDICINE

## 2025-03-27 RX ORDER — DUPILUMAB 200 MG/1.14ML
200 INJECTION, SOLUTION SUBCUTANEOUS
COMMUNITY
Start: 2025-03-13

## 2025-03-27 RX ORDER — TRAZODONE HYDROCHLORIDE 50 MG/1
1 TABLET ORAL NIGHTLY
COMMUNITY
Start: 2025-03-14 | End: 2025-04-13

## 2025-03-27 NOTE — PROGRESS NOTES
Subjective:      Patient ID: Beena Potter is a 59 y.o. female.    Chief Complaint: Establish Care      History of Present Illness    CHIEF COMPLAINT:  Ms. Potter presents today to establish care.     HISTORY OF PRESENT ILLNESS:  She reports intermittent right abdominal pain for the past couple months. The worst flare up occurred on Sunday with radiation to back and associated nausea. She denies vomiting.    MEDICAL HISTORY:  She has a history of stage 3 rectal cancer diagnosed in 2012 at age 45, treated with radiation, chemotherapy, and surgery, now in remission. She has asthma, well controlled on Dupixent after previous trials of various inhalers. She recently underwent D&C for postmenopausal bleeding due to polyps found on ultrasound. She has a history of resolved anemia. Her prediabetes has improved with A1C decreasing from 6.2-6.3 to 5.6, likely related to weight loss.    SLEEP:  She reports difficulty staying asleep at night, which has significantly improved with nightly Trazodone 50 mg started about a year ago.    CURRENT MEDICATIONS:  She takes hormone pellets and progesterone for hormone replacement therapy, spironolactone for acne management and hormone support, metformin for prediabetes management, Dupixent for asthma, and Trazodone for sleep.        Past Medical History:   Diagnosis Date    Asthma     Colon cancer 2013    radiation and chemo    Rectal cancer 08/01/2012          Past Surgical History:   Procedure Laterality Date    colon cancer  08/01/2012    FOOT SURGERY Right     HYSTEROSCOPY WITH DILATION AND CURETTAGE OF UTERUS N/A 08/31/2022    Procedure: HYSTEROSCOPY, WITH DILATION AND CURETTAGE OF UTERUS MYOSURE;  Surgeon: Mariah Babin MD;  Location: [facility];  Service: OB/GYN;  Laterality: N/A;     Family History   Problem Relation Name Age of Onset    Hypertension Father      Hypertension Mother       Social History[1]  Review of patient's allergies indicates:   Allergen Reactions     "Scopolamine Rash       Review of Systems   HENT:  Negative for hearing loss.    Eyes:  Negative for discharge.   Respiratory:  Negative for wheezing.    Cardiovascular:  Negative for chest pain and palpitations.   Gastrointestinal:  Positive for abdominal pain and nausea. Negative for blood in stool, constipation, diarrhea and vomiting.   Genitourinary:  Negative for dysuria and hematuria.   Musculoskeletal:  Negative for neck pain.   Neurological:  Negative for weakness and headaches.   Endo/Heme/Allergies:  Negative for polydipsia.     Objective:       /78   Temp 97.9 °F (36.6 °C) (Tympanic)   Ht 5' 4" (1.626 m)   Wt 88 kg (194 lb 0.1 oz)   LMP 12/19/2014 (Approximate)   SpO2 98%   BMI 33.30 kg/m²   Physical Exam    Cardiovascular: Pulses are good.  Abdomen: Soreness in right upper quadrant.        Physical Exam  Vitals reviewed.   Constitutional:       General: She is not in acute distress.     Appearance: Normal appearance. She is well-developed. She is not ill-appearing or diaphoretic.   HENT:      Head: Normocephalic and atraumatic.      Right Ear: Hearing, tympanic membrane, ear canal and external ear normal.      Left Ear: Hearing, tympanic membrane, ear canal and external ear normal.      Nose: Nose normal.      Mouth/Throat:      Pharynx: Uvula midline. No oropharyngeal exudate.   Eyes:      Conjunctiva/sclera: Conjunctivae normal.      Pupils: Pupils are equal, round, and reactive to light.   Neck:      Thyroid: No thyromegaly.      Trachea: No tracheal deviation.   Cardiovascular:      Rate and Rhythm: Normal rate and regular rhythm.      Heart sounds: Normal heart sounds. No murmur heard.  Pulmonary:      Effort: Pulmonary effort is normal. No respiratory distress.      Breath sounds: Normal breath sounds.   Abdominal:      General: Bowel sounds are normal.      Palpations: Abdomen is soft.      Tenderness: There is abdominal tenderness (mild,RUQ). There is no guarding.   Musculoskeletal:    "      General: Normal range of motion.      Cervical back: Normal range of motion and neck supple.   Lymphadenopathy:      Cervical: No cervical adenopathy.   Skin:     General: Skin is warm and dry.      Capillary Refill: Capillary refill takes less than 2 seconds.   Neurological:      General: No focal deficit present.      Mental Status: She is alert and oriented to person, place, and time.   Psychiatric:         Mood and Affect: Mood normal.         Behavior: Behavior normal.         Thought Content: Thought content normal.         Judgment: Judgment normal.         Assessment:     1. Routine adult health maintenance    2. Asthma, well controlled, unspecified asthma severity, unspecified whether persistent    3. RUQ pain    4. Prediabetes    5. History of rectal cancer      Plan:   Assessment & Plan    RUQ PAIN:  - Suspect gallbladder issue based on right upper quadrant pain and associated symptoms.  - Ordered abdominal US to evaluate suspected gallbladder issue.  - Noted possible need for surgical consultation if stones or sludge are detected.  - Explained typical presentation and diagnostic approach for gallbladder issues.  - Discussed limited non-surgical options for gallbladder problems once symptomatic.    PREDIABETES:  - Prediabetes status and recent A1C improvement (5.6).  - Continued current management with metformin until next lab review in May.    ASTHMA:  - Well-controlled asthma with Dupixent, noting significant improvement.    ANEMIA:  - Resolved anemia following previous D&C procedure.    HISTORY OF RECTAL CANCER:  - History of rectal cancer (2012, stage 3) with current remission status.  - Considered annual CEA level monitoring as reasonable follow-up for cancer history.    FOLLOW-UP AND LABS:  - Ordered lab work for May, including vitamin D level, fasting glucose, and CEA level.  - Follow up in May to review lab results and discuss potential discontinuation of metformin.  - Option for virtual  visit offered if more convenient for the patient.        Routine adult health maintenance  -     Comprehensive Metabolic Panel; Future; Expected date: 2025  -     CBC Auto Differential; Future; Expected date: 2025  -     Hemoglobin A1C; Future; Expected date: 2025  -     Lipid Panel; Future; Expected date: 2025    Asthma, well controlled, unspecified asthma severity, unspecified whether persistent    RUQ pain  -     US Abdomen Complete; Future; Expected date: 2025    Prediabetes  -     Comprehensive Metabolic Panel; Future; Expected date: 2025  -     CBC Auto Differential; Future; Expected date: 2025  -     Hemoglobin A1C; Future; Expected date: 2025  -     Lipid Panel; Future; Expected date: 2025    History of rectal cancer      Medication List with Changes/Refills   Current Medications    ALBUTEROL (PROVENTIL) 2.5 MG /3 ML (0.083 %) NEBULIZER SOLUTION    SMARTSI Vial(s) Via Nebulizer 1-4 Times Daily PRN    DUPIXENT  MG/1.14 ML PNIJ    Inject 200 mg into the skin every 14 (fourteen) days.    ESTRADIOL 50 MG PELLET    Inject 50 mg into the muscle. Every 12-14 weeks    FLUTICASONE/UMECLIDIN/VILANTER (TRELEGY ELLIPTA INHL)    Inhale into the lungs once daily at 6am.    METFORMIN (GLUCOPHAGE-XR) 500 MG ER 24HR TABLET    2 tablets    MONTELUKAST (SINGULAIR) 10 MG TABLET    Take 10 mg by mouth once daily.    PROGESTERONE (PROMETRIUM) 200 MG CAPSULE    TAKE 1 CAPSULE BY MOUTH NIGHTLY AT BEDTIME    SPIRONOLACTONE (ALDACTONE) 25 MG TABLET    Take 25 mg by mouth 2 (two) times daily.    TESTOSTERONE 100 MG PELLET    Inject 100 mg into the muscle. Every 12-14 weeks    TRAZODONE (DESYREL) 50 MG TABLET    Take 1 tablet by mouth every evening.   Discontinued Medications    FLUTICASONE FUROATE-VILANTEROL (BREO) 200-25 MCG/DOSE DSDV DISKUS INHALER        HYDROCODONE-ACETAMINOPHEN (NORCO) 5-325 MG PER TABLET    Take 1 tablet by mouth every 4 (four) hours as needed for  Pain.    MIRABEGRON (MYRBETRIQ) 25 MG TB24 ER TABLET    Take 1 tablet (25 mg total) by mouth once daily.    PREDNISONE (DELTASONE) 20 MG TABLET    Take 20 mg by mouth once daily.    SODIUM CHLORIDE FOR INHALATION (SODIUM CHLORIDE 0.9%) 0.9 % NEBULIZER SOLUTION    Take by nebulization 3 (three) times daily.       This note was generated with the assistance of ambient listening technology. Verbal consent was obtained by the patient and accompanying visitor(s) for the recording of patient appointment to facilitate this note. I attest to having reviewed and edited the generated note for accuracy, though some syntax or spelling errors may persist. Please contact the author of this note for any clarification.       Answers submitted by the patient for this visit:  Review of Systems Questionnaire (Submitted on 3/21/2025)  activity change: No  unexpected weight change: No  rhinorrhea: No  trouble swallowing: No  visual disturbance: No  chest tightness: No  polyuria: No  difficulty urinating: No  menstrual problem: No  joint swelling: No  arthralgias: No  confusion: No  dysphoric mood: No         [1]   Social History  Socioeconomic History    Marital status:    Tobacco Use    Smoking status: Never    Smokeless tobacco: Never   Substance and Sexual Activity    Alcohol use: No    Drug use: No    Sexual activity: Yes     Partners: Male     Birth control/protection: None     Social Drivers of Health     Financial Resource Strain: Low Risk  (3/21/2025)    Overall Financial Resource Strain (CARDIA)     Difficulty of Paying Living Expenses: Not hard at all   Food Insecurity: No Food Insecurity (3/21/2025)    Hunger Vital Sign     Worried About Running Out of Food in the Last Year: Never true     Ran Out of Food in the Last Year: Never true   Transportation Needs: No Transportation Needs (3/21/2025)    PRAPARE - Transportation     Lack of Transportation (Medical): No     Lack of Transportation (Non-Medical): No   Physical  Activity: Insufficiently Active (3/21/2025)    Exercise Vital Sign     Days of Exercise per Week: 3 days     Minutes of Exercise per Session: 30 min   Stress: Stress Concern Present (3/21/2025)    [employer] - Occupational Stress Questionnaire     Feeling of Stress : To some extent   Housing Stability: Low Risk  (3/21/2025)    Housing Stability Vital Sign     Unable to Pay for Housing in the Last Year: No     Homeless in the Last Year: No

## 2025-03-28 ENCOUNTER — HOSPITAL ENCOUNTER (OUTPATIENT)
Dept: RADIOLOGY | Facility: HOSPITAL | Age: 60
Discharge: HOME | End: 2025-03-28
Attending: FAMILY MEDICINE
Payer: OTHER GOVERNMENT

## 2025-03-28 ENCOUNTER — RESULTS FOLLOW-UP (OUTPATIENT)
Dept: INTERNAL MEDICINE | Facility: CLINIC | Age: 60
End: 2025-03-28

## 2025-03-28 DIAGNOSIS — R10.11 RUQ PAIN: ICD-10-CM

## 2025-03-28 PROCEDURE — 76700 US EXAM ABDOM COMPLETE: CPT | Mod: TC

## 2025-03-28 PROCEDURE — 76700 US EXAM ABDOM COMPLETE: CPT | Mod: 26,,, | Performed by: STUDENT IN AN ORGANIZED HEALTH CARE EDUCATION/TRAINING PROGRAM

## 2025-04-04 ENCOUNTER — OFFICE VISIT (OUTPATIENT)
Dept: ORTHOPEDICS | Facility: CLINIC | Age: 60
End: 2025-04-04
Payer: OTHER GOVERNMENT

## 2025-04-04 ENCOUNTER — HOSPITAL ENCOUNTER (OUTPATIENT)
Dept: RADIOLOGY | Facility: HOSPITAL | Age: 60
Discharge: HOME | End: 2025-04-04
Attending: ORTHOPAEDIC SURGERY
Payer: OTHER GOVERNMENT

## 2025-04-04 VITALS — WEIGHT: 194 LBS | BODY MASS INDEX: 33.12 KG/M2 | HEIGHT: 64 IN

## 2025-04-04 DIAGNOSIS — M25.561 CHRONIC PAIN OF RIGHT KNEE: ICD-10-CM

## 2025-04-04 DIAGNOSIS — M17.11 PRIMARY OSTEOARTHRITIS OF RIGHT KNEE: Primary | ICD-10-CM

## 2025-04-04 DIAGNOSIS — M25.561 RIGHT KNEE PAIN, UNSPECIFIED CHRONICITY: ICD-10-CM

## 2025-04-04 DIAGNOSIS — G89.29 CHRONIC PAIN OF RIGHT KNEE: ICD-10-CM

## 2025-04-04 PROCEDURE — 73564 X-RAY EXAM KNEE 4 OR MORE: CPT | Mod: 26,RT,, | Performed by: RADIOLOGY

## 2025-04-04 PROCEDURE — 99999 PR PBB SHADOW E&M-EST. PATIENT-LVL III: CPT | Mod: PBBFAC,,,

## 2025-04-04 PROCEDURE — 99213 OFFICE O/P EST LOW 20 MIN: CPT | Mod: PBBFAC,25

## 2025-04-04 PROCEDURE — 73562 X-RAY EXAM OF KNEE 3: CPT | Mod: TC,LT

## 2025-04-04 PROCEDURE — 73562 X-RAY EXAM OF KNEE 3: CPT | Mod: 26,LT,, | Performed by: RADIOLOGY

## 2025-04-04 RX ORDER — MELOXICAM 15 MG/1
15 TABLET ORAL DAILY PRN
Qty: 30 TABLET | Refills: 1 | Status: SHIPPED | OUTPATIENT
Start: 2025-04-04

## 2025-04-04 NOTE — PATIENT INSTRUCTIONS
Encounter Diagnoses   Name Primary?    Primary osteoarthritis of right knee Yes    Chronic pain of right knee        ASSESSMENT:  Right knee Kellgren Wilmer grade 3 arthritis with valgus deformity    TREATMENT/PLAN:  Start physical [facility]  Discussed to use compressive knee sleeve  Start Mobic 15 QD PRN  Follow up 2 months   Consider MRI right knee

## 2025-04-04 NOTE — PROGRESS NOTES
Name: Beena Potter  MRN: [id number]  Date: 4/4/2025      Patient ID: Beena Potter is a 59 y.o. female.     Chief Complaint: Pain of the Right Knee      Patient Instructions     Encounter Diagnoses   Name Primary?    Primary osteoarthritis of right knee Yes    Chronic pain of right knee        ASSESSMENT:  Right knee Kellgren Wilmer grade 3 arthritis with valgus deformity    TREATMENT/PLAN:  Start physical therapy at [facility]  Discussed to use compressive knee sleeve  Start Mobic 15 QD PRN  Follow up 2 months   Consider MRI right knee    HPI: Beena Potter is a 59 y.o. female.Patient presents today for evaluation of right knee pain that has progressively worsened over the past year.  Pain is rated 4/10 today.  She experiences pain daily, but it is not severe.  She has increased pain with standing from a seated position.  She notes that the lateral portion of the knee locks at times after being seated for a long period of time.  She is taking ibuprofen sparingly with relief.  Denies any injury or trauma.      XRAY:  Right knee - 04/04/2025 at [facility]  EXAM:  XR KNEE ORTHO RIGHT WITH FLEXION (XPD)  CLINICAL HISTORY:    Right knee joint pain  TECHNIQUE: 4 views of the right knee.  COMPARISON: None.  FINDINGS:    Moderate lateral compartment narrowing with sclerosis and osteophytosis is present.  Medial knee compartment demonstrates minimal marginal spurring.  Mild patellofemoral joint spurring is present.  The left knee demonstrates an accessory patella or bipartite patella.  Impression:   Moderate grade lateral compartment degenerative joint disease right knee.  Finalized on: 4/4/2025 12:05 PM By:  Davide Mora MD  [facility]# [id number]      2025-04-04 12:07:[id number]     [facility]      PHYSICAL EXAMINATION:  Body mass index is 33.3 kg/m².,     GENERAL:   Pleasant and cooperative with normal work of breathing    EXTREMITY:   Right knee:   Range of motion 0-130+   Nontender   No rashes,  wounds, erythema   No effusion  Neurovascular status grossly intact   Compartments soft      MEDICATIONS: Current Medications[1]    ALLERGIES: \  Review of patient's allergies indicates:   Allergen Reactions    Scopolamine Rash       MEDICAL HISTORY:   Past Medical History:   Diagnosis Date    Asthma     Colon cancer 2013    radiation and chemo    Rectal cancer 08/01/2012       SURGICAL HISTORY:   Past Surgical History:   Procedure Laterality Date    colon cancer  08/01/2012    FOOT SURGERY Right     HYSTEROSCOPY WITH DILATION AND CURETTAGE OF UTERUS N/A 08/31/2022    Procedure: HYSTEROSCOPY, WITH DILATION AND CURETTAGE OF UTERUS MYOSURE;  Surgeon: Mariah Babin MD;  Location: [facility];  Service: OB/GYN;  Laterality: N/A;       REVIEW OF SYSTEMS:   No fevers, chills, sweats, chest pain or shortness of breath.    SOCIAL HISTORY:   Social History     Occupational History    Not on file   Tobacco Use    Smoking status: Never    Smokeless tobacco: Never   Substance and Sexual Activity    Alcohol use: No    Drug use: No    Sexual activity: Yes     Partners: Male     Birth control/protection: None       Donald Wu PA-C   Orthopedic Surgery          [1]   Current Outpatient Medications:     albuterol (PROVENTIL) 2.5 mg /3 mL (0.083 %) nebulizer solution, , Disp: , Rfl:     DUPIXENT  mg/1.14 mL PnIj, Inject 200 mg into the skin every 14 (fourteen) days., Disp: , Rfl:     estradiol 50 mg pellet, Inject 50 mg into the muscle. Every 12-14 weeks, Disp: , Rfl:     fluticasone/umeclidin/vilanter (TRELEGY ELLIPTA INHL), Inhale into the lungs once daily at 6am., Disp: , Rfl:     metFORMIN (GLUCOPHAGE-XR) 500 MG ER 24hr tablet, 2 tablets, Disp: , Rfl:     montelukast (SINGULAIR) 10 mg tablet, Take 10 mg by mouth once daily., Disp: , Rfl:     progesterone (PROMETRIUM) 200 MG capsule, TAKE 1 CAPSULE BY MOUTH NIGHTLY AT BEDTIME, Disp: , Rfl:     spironolactone (ALDACTONE) 25 MG tablet, Take 25 mg by mouth 2 (two) times  daily., Disp: , Rfl:     testosterone 100 mg pellet, Inject 100 mg into the muscle. Every 12-14 weeks, Disp: , Rfl:     traZODone (DESYREL) 50 MG tablet, Take 1 tablet by mouth every evening., Disp: , Rfl:     meloxicam (MOBIC) 15 MG tablet, Take 1 tablet (15 mg total) by mouth daily as needed for Pain., Disp: 30 tablet, Rfl: 1

## 2025-05-06 ENCOUNTER — LAB VISIT (OUTPATIENT)
Dept: LAB | Facility: HOSPITAL | Age: 60
End: 2025-05-06
Attending: FAMILY MEDICINE
Payer: OTHER GOVERNMENT

## 2025-05-06 DIAGNOSIS — Z00.00 ROUTINE ADULT HEALTH MAINTENANCE: ICD-10-CM

## 2025-05-06 DIAGNOSIS — R73.03 PREDIABETES: ICD-10-CM

## 2025-05-06 LAB
ABSOLUTE EOSINOPHIL (OHS): 0.36 K/UL
ABSOLUTE MONOCYTE (OHS): 0.89 K/UL (ref 0.3–1)
ABSOLUTE NEUTROPHIL COUNT (OHS): 8.54 K/UL (ref 1.8–7.7)
ALBUMIN SERPL BCP-MCNC: 3.5 G/DL (ref 3.5–5.2)
ALP SERPL-CCNC: 49 UNIT/L (ref 40–150)
ALT SERPL W/O P-5'-P-CCNC: 28 UNIT/L (ref 10–44)
ANION GAP (OHS): 10 MMOL/L (ref 8–16)
AST SERPL-CCNC: 19 UNIT/L (ref 11–45)
BASOPHILS # BLD AUTO: 0.11 K/UL
BASOPHILS NFR BLD AUTO: 0.9 %
BILIRUB SERPL-MCNC: 0.5 MG/DL (ref 0.1–1)
BUN SERPL-MCNC: 18 MG/DL (ref 6–20)
CALCIUM SERPL-MCNC: 9.3 MG/DL (ref 8.7–10.5)
CHLORIDE SERPL-SCNC: 100 MMOL/L (ref 95–110)
CHOLEST SERPL-MCNC: 172 MG/DL (ref 120–199)
CHOLEST/HDLC SERPL: 3.9 {RATIO} (ref 2–5)
CO2 SERPL-SCNC: 26 MMOL/L (ref 23–29)
CREAT SERPL-MCNC: 0.8 MG/DL (ref 0.5–1.4)
EAG (OHS): 117 MG/DL (ref 68–131)
ERYTHROCYTE [DISTWIDTH] IN BLOOD BY AUTOMATED COUNT: 12.6 % (ref 11.5–14.5)
GFR SERPLBLD CREATININE-BSD FMLA CKD-EPI: >60 ML/MIN/1.73/M2
GLUCOSE SERPL-MCNC: 116 MG/DL (ref 70–110)
HBA1C MFR BLD: 5.7 % (ref 4–5.6)
HCT VFR BLD AUTO: 44.7 % (ref 37–48.5)
HDLC SERPL-MCNC: 44 MG/DL (ref 40–75)
HDLC SERPL: 25.6 % (ref 20–50)
HGB BLD-MCNC: 14.9 GM/DL (ref 12–16)
IMM GRANULOCYTES # BLD AUTO: 0.12 K/UL (ref 0–0.04)
IMM GRANULOCYTES NFR BLD AUTO: 1 % (ref 0–0.5)
LDLC SERPL CALC-MCNC: 77.6 MG/DL (ref 63–159)
LYMPHOCYTES # BLD AUTO: 2.51 K/UL (ref 1–4.8)
MCH RBC QN AUTO: 31.6 PG (ref 27–31)
MCHC RBC AUTO-ENTMCNC: 33.3 G/DL (ref 32–36)
MCV RBC AUTO: 95 FL (ref 82–98)
NONHDLC SERPL-MCNC: 128 MG/DL
NUCLEATED RBC (/100WBC) (OHS): 0 /100 WBC
PLATELET # BLD AUTO: 314 K/UL (ref 150–450)
PMV BLD AUTO: 10.6 FL (ref 9.2–12.9)
POTASSIUM SERPL-SCNC: 4.5 MMOL/L (ref 3.5–5.1)
PROT SERPL-MCNC: 6.8 GM/DL (ref 6–8.4)
RBC # BLD AUTO: 4.72 M/UL (ref 4–5.4)
RELATIVE EOSINOPHIL (OHS): 2.9 %
RELATIVE LYMPHOCYTE (OHS): 20 % (ref 18–48)
RELATIVE MONOCYTE (OHS): 7.1 % (ref 4–15)
RELATIVE NEUTROPHIL (OHS): 68.1 % (ref 38–73)
SODIUM SERPL-SCNC: 136 MMOL/L (ref 136–145)
TRIGL SERPL-MCNC: 252 MG/DL (ref 30–150)
WBC # BLD AUTO: 12.53 K/UL (ref 3.9–12.7)

## 2025-05-06 PROCEDURE — 82040 ASSAY OF SERUM ALBUMIN: CPT

## 2025-05-06 PROCEDURE — 36415 COLL VENOUS BLD VENIPUNCTURE: CPT | Mod: PO

## 2025-05-06 PROCEDURE — 83036 HEMOGLOBIN GLYCOSYLATED A1C: CPT

## 2025-05-06 PROCEDURE — 85025 COMPLETE CBC W/AUTO DIFF WBC: CPT

## 2025-05-06 PROCEDURE — 80061 LIPID PANEL: CPT

## 2025-05-13 ENCOUNTER — OFFICE VISIT (OUTPATIENT)
Dept: INTERNAL MEDICINE | Facility: CLINIC | Age: 60
End: 2025-05-13
Payer: OTHER GOVERNMENT

## 2025-05-13 VITALS
WEIGHT: 196.19 LBS | HEIGHT: 64 IN | BODY MASS INDEX: 33.49 KG/M2 | SYSTOLIC BLOOD PRESSURE: 131 MMHG | TEMPERATURE: 98 F | DIASTOLIC BLOOD PRESSURE: 62 MMHG | OXYGEN SATURATION: 97 % | HEART RATE: 80 BPM

## 2025-05-13 DIAGNOSIS — Z00.00 ROUTINE ADULT HEALTH MAINTENANCE: ICD-10-CM

## 2025-05-13 DIAGNOSIS — J45.909 ASTHMA, WELL CONTROLLED, UNSPECIFIED ASTHMA SEVERITY, UNSPECIFIED WHETHER PERSISTENT: ICD-10-CM

## 2025-05-13 DIAGNOSIS — R73.03 PREDIABETES: Primary | ICD-10-CM

## 2025-05-13 PROCEDURE — 99999 PR PBB SHADOW E&M-EST. PATIENT-LVL IV: CPT | Mod: PBBFAC,,, | Performed by: FAMILY MEDICINE

## 2025-05-13 PROCEDURE — 99214 OFFICE O/P EST MOD 30 MIN: CPT | Mod: PBBFAC,PO | Performed by: FAMILY MEDICINE

## 2025-05-13 RX ORDER — METFORMIN HYDROCHLORIDE 500 MG/1
1000 TABLET, EXTENDED RELEASE ORAL 2 TIMES DAILY WITH MEALS
Qty: 360 TABLET | Refills: 3 | Status: SHIPPED | OUTPATIENT
Start: 2025-05-13

## 2025-05-13 RX ORDER — MONTELUKAST SODIUM 10 MG/1
10 TABLET ORAL DAILY
Qty: 90 TABLET | Refills: 3 | Status: SHIPPED | OUTPATIENT
Start: 2025-05-13

## 2025-05-13 RX ORDER — FLUTICASONE FUROATE, UMECLIDINIUM BROMIDE AND VILANTEROL TRIFENATATE 200; 62.5; 25 UG/1; UG/1; UG/1
1 POWDER RESPIRATORY (INHALATION) DAILY
Qty: 180 EACH | Refills: 3 | Status: SHIPPED | OUTPATIENT
Start: 2025-05-13

## 2025-05-13 RX ORDER — TRAZODONE HYDROCHLORIDE 50 MG/1
50 TABLET ORAL NIGHTLY
Qty: 90 TABLET | Refills: 1 | Status: SHIPPED | OUTPATIENT
Start: 2025-05-13 | End: 2025-07-12

## 2025-05-16 NOTE — PROGRESS NOTES
Subjective:      Patient ID: Beena Potter is a 59 y.o. female.    Chief Complaint: Follow-up      History of Present Illness    CHIEF COMPLAINT:  Ms. Potter presents today for follow up. Labs drawn earlier discussed today with the patient - triglycerides elevated otherwise all at goal.    SURGICAL HISTORY:  She recently underwent gallbladder surgery requiring six incisions, performed by the same surgeon who completed her previous colon surgery and hernia repair with mesh placement. The procedure took 1.5 hours.    LABS:  She reports elevated fasting glucose with A1C increased from previous value of 5.6.    SLEEP:  She reports intermittent difficulty sleeping, typically waking around 3 AM after going to bed at 10-10:30 PM. She takes Trazodone as needed with good effect.        Past Medical History:   Diagnosis Date    Asthma     Colon cancer 2013    radiation and chemo    Rectal cancer 08/01/2012          Past Surgical History:   Procedure Laterality Date    colon cancer  08/01/2012    FOOT SURGERY Right     HYSTEROSCOPY WITH DILATION AND CURETTAGE OF UTERUS N/A 08/31/2022    Procedure: HYSTEROSCOPY, WITH DILATION AND CURETTAGE OF UTERUS MYOSURE;  Surgeon: Mariah Babin MD;  Location: [facility];  Service: OB/GYN;  Laterality: N/A;     Family History   Problem Relation Name Age of Onset    Hypertension Father      Hypertension Mother       Social History[1]  Review of patient's allergies indicates:   Allergen Reactions    Scopolamine Rash       Review of Systems   Constitutional:  Negative for chills, fever and malaise/fatigue.   HENT:  Negative for congestion.    Respiratory:  Negative for cough and shortness of breath.    Cardiovascular:  Negative for chest pain and palpitations.   Gastrointestinal:  Negative for abdominal pain.   Musculoskeletal:  Negative for myalgias.   Skin:  Negative for rash.   Psychiatric/Behavioral:  The patient has insomnia.      Objective:       /62 (BP Location: Left arm,  "Patient Position: Sitting)   Pulse 80   Temp 97.9 °F (36.6 °C) (Tympanic)   Ht 5' 4" (1.626 m)   Wt 89 kg (196 lb 3.4 oz)   LMP 12/19/2014 (Approximate)   SpO2 97%   BMI 33.68 kg/m²   Physical Exam             Physical Exam  Vitals reviewed.   Constitutional:       General: She is not in acute distress.     Appearance: Normal appearance. She is well-developed. She is not ill-appearing or diaphoretic.   HENT:      Head: Normocephalic and atraumatic.      Right Ear: Hearing, tympanic membrane, ear canal and external ear normal.      Left Ear: Hearing, tympanic membrane, ear canal and external ear normal.      Nose: Nose normal.      Mouth/Throat:      Pharynx: Uvula midline. No oropharyngeal exudate.   Eyes:      Conjunctiva/sclera: Conjunctivae normal.      Pupils: Pupils are equal, round, and reactive to light.   Neck:      Thyroid: No thyromegaly.      Trachea: No tracheal deviation.   Cardiovascular:      Rate and Rhythm: Normal rate and regular rhythm.      Heart sounds: Normal heart sounds. No murmur heard.  Pulmonary:      Effort: Pulmonary effort is normal. No respiratory distress.      Breath sounds: Normal breath sounds.   Abdominal:      General: Bowel sounds are normal.      Palpations: Abdomen is soft.      Tenderness: There is no abdominal tenderness. There is no guarding.      Hernia: No hernia is present.   Musculoskeletal:         General: Normal range of motion.      Cervical back: Normal range of motion and neck supple.   Lymphadenopathy:      Cervical: No cervical adenopathy.   Skin:     General: Skin is warm and dry.      Capillary Refill: Capillary refill takes less than 2 seconds.   Neurological:      General: No focal deficit present.      Mental Status: She is alert and oriented to person, place, and time.   Psychiatric:         Mood and Affect: Mood normal.         Behavior: Behavior normal.         Thought Content: Thought content normal.         Judgment: Judgment normal. "         Assessment:     1. Prediabetes    2. Asthma, well controlled, unspecified asthma severity, unspecified whether persistent    3. Routine adult health maintenance      Plan:   Assessment & Plan    MEDICAL DECISION MAKING:  - Reviewed recent gallbladder surgery and pathology report, noting no suspicion of cancer.  - Elevated glucose and A1C despite Metformin use.  - Triglycerides elevated but other cholesterol values within acceptable range.  - Considered sleep issues and effectiveness of current as-needed Trazodone regimen.  - Discussed impact of recent surgery on WBC counts, attributing elevation to post-op inflammation.    PATIENT EDUCATION:  - Explained relationship between elevated triglycerides, glucose levels, and carbohydrate intake in diet.    ACTION ITEMS/LIFESTYLE:  - Ms. Potter to reduce sugar and carbohydrate intake to help lower triglyceride levels.    MEDICATIONS:  - Continued Metformin 2 tablets twice daily.  - Refilled Spironolactone.  - Refilled Singulair.  - Refilled Trelegy 200 mcg.  - Continued Trazodone as needed for sleep.    ORDERS:  - Ordered repeat labs in 6 months.    FOLLOW UP:  - Follow up in 6 months for repeat labs.        Prediabetes  -     Comprehensive Metabolic Panel; Future; Expected date: 11/09/2025  -     Lipid Panel; Future; Expected date: 11/09/2025  -     Hemoglobin A1C; Future; Expected date: 11/09/2025  -     CBC Auto Differential; Future; Expected date: 11/09/2025    Asthma, well controlled, unspecified asthma severity, unspecified whether persistent    Routine adult health maintenance  -     Comprehensive Metabolic Panel; Future; Expected date: 11/09/2025  -     Lipid Panel; Future; Expected date: 11/09/2025  -     Hemoglobin A1C; Future; Expected date: 11/09/2025  -     CBC Auto Differential; Future; Expected date: 11/09/2025    Other orders  -     metFORMIN (GLUCOPHAGE-XR) 500 MG ER 24hr tablet; Take 2 tablets (1,000 mg total) by mouth 2 (two) times daily with  meals.  Dispense: 360 tablet; Refill: 3  -     traZODone (DESYREL) 50 MG tablet; Take 1 tablet (50 mg total) by mouth every evening.  Dispense: 90 tablet; Refill: 1  -     montelukast (SINGULAIR) 10 mg tablet; Take 1 tablet (10 mg total) by mouth once daily.  Dispense: 90 tablet; Refill: 3  -     fluticasone-umeclidin-vilanter (TRELEGY ELLIPTA) 200-62.5-25 mcg inhaler; Inhale 1 puff into the lungs once daily.  Dispense: 180 each; Refill: 3      Medication List with Changes/Refills   Current Medications    ALBUTEROL (PROVENTIL) 2.5 MG /3 ML (0.083 %) NEBULIZER SOLUTION        DUPIXENT  MG/1.14 ML PNIJ    Inject 200 mg into the skin every 14 (fourteen) days.    ESTRADIOL 50 MG PELLET    Inject 50 mg into the muscle. Every 12-14 weeks    PROGESTERONE (PROMETRIUM) 200 MG CAPSULE    TAKE 1 CAPSULE BY MOUTH NIGHTLY AT BEDTIME    SPIRONOLACTONE (ALDACTONE) 25 MG TABLET    Take 25 mg by mouth 2 (two) times daily.    TESTOSTERONE 100 MG PELLET    Inject 100 mg into the muscle. Every 12-14 weeks   Changed and/or Refilled Medications    Modified Medication Previous Medication    FLUTICASONE-UMECLIDIN-VILANTER (TRELEGY ELLIPTA) 200-62.5-25 MCG INHALER fluticasone/umeclidin/vilanter (TRELEGY ELLIPTA INHL)       Inhale 1 puff into the lungs once daily.    Inhale into the lungs once daily at 6am.    METFORMIN (GLUCOPHAGE-XR) 500 MG ER 24HR TABLET metFORMIN (GLUCOPHAGE-XR) 500 MG ER 24hr tablet       Take 2 tablets (1,000 mg total) by mouth 2 (two) times daily with meals.    500 mg 2 (two) times daily with meals.    MONTELUKAST (SINGULAIR) 10 MG TABLET montelukast (SINGULAIR) 10 mg tablet       Take 1 tablet (10 mg total) by mouth once daily.    Take 10 mg by mouth once daily.    TRAZODONE (DESYREL) 50 MG TABLET traZODone (DESYREL) 50 MG tablet       Take 1 tablet (50 mg total) by mouth every evening.    Take 1 tablet by mouth every evening.   Discontinued Medications    MELOXICAM (MOBIC) 15 MG TABLET    Take 1 tablet (15 mg  total) by mouth daily as needed for Pain.       This note was generated with the assistance of ambient listening technology. Verbal consent was obtained by the patient and accompanying visitor(s) for the recording of patient appointment to facilitate this note. I attest to having reviewed and edited the generated note for accuracy, though some syntax or spelling errors may persist. Please contact the author of this note for any clarification.            [1]   Social History  Socioeconomic History    Marital status:    Tobacco Use    Smoking status: Never    Smokeless tobacco: Never   Substance and Sexual Activity    Alcohol use: No    Drug use: No    Sexual activity: Yes     Partners: Male     Birth control/protection: None     Social Drivers of Health     Financial Resource Strain: Low Risk  (3/21/2025)    Overall Financial Resource Strain (CARDIA)     Difficulty of Paying Living Expenses: Not hard at all   Food Insecurity: No Food Insecurity (5/2/2025)    [facility]    Hunger Vital Sign     Worried About Running Out of Food in the Last Year: Never true     Ran Out of Food in the Last Year: Never true   Transportation Needs: No Transportation Needs (5/2/2025)    Received from [facility] Subsidi[facility] and Affiliates    PRAPARE - Transportation     Lack of Transportation (Medical): No     Lack of Transportation (Non-Medical): No   Physical Activity: Insufficiently Active (3/21/2025)    Exercise Vital Sign     Days of Exercise per Week: 3 days     Minutes of Exercise per Session: 30 min   Stress: Stress Concern Present (3/21/2025)    [employer] - Occupational Stress Questionnaire     Feeling of Stress : To some extent   Housing Stability: Low Risk  (5/2/2025)    [facility] Its Subsidiaries and  Affiliates    Housing Stability Vital Sign     Unable to Pay for Housing in the Last Year: No     Number of Times Moved in the Last Year: 0     Homeless in the Last Year: No

## 2025-07-14 NOTE — PROGRESS NOTES
Jason Franco MD  Orthopedic Surgeon   [address] [place] [address]                 Name: Beena Potter  MRN: [id number]  Date: 7/15/2025    Patient ID: Beena Potter is a 59 y.o. female from [place]    Reason for visit: Chronic right knee pain due to osteoarthritis     Patient Instructions     Encounter Diagnosis   Name Primary?    Primary osteoarthritis of right knee Yes       ASSESSMENT:  Kellgren Wilmer grade 3-4 with a valgus angulation  Mechanical locking symptoms when sitting and pain going downstairs    TREATMENT/PLAN:  Hinged knee brace for mechanical support right knee  Continue Mobic PRN  Home exercise program with quadriceps hamstring and calf strengthening was given.  MRI of the right knee to evaluate for a meniscal tear laterally that could be treated with the arthroscopy.  The patient is having mechanical symptoms of locking of the knee.  Discussed possible future treatment injections with a corticosteroid for swelling and/or Visco supplement if not having a large meniscal tear.  Also discussed possibly in the future knee replacement if she has progressive symptoms despite conservative treatment    Return to office after MRI for further discussions regarding treatment options to include possible arthroscopy, for partial lateral meniscectomy.    HISTORY OF PRESENT ILLNESS:   Beena Potter is a 59 y.o. female.Patient presents today for right knee evaluation.  Seen previously by physician assistant in my office April 2025  Was unable to forward out-of-pocket expense for physical therapy.  Tried Mobic takes experiencing.  Does help a bit.  2/10 pain described as mild and achy gradually getting worse over the last year.  Having problems descending stairs and when sitting her knee locks up  and she has to move it around to get it to unlock.  Pain is localized to the lateral joint line.      Objective     XRAY: [facility] 4/4/25  Bone-on-bone lateral compartment with moderate size osteophytes moderate sclerosis and valgus angulation.  Kellgren Wilmer grade 3-4 arthritis.    PHYSICAL EXAMINATION: Body mass index is 33.68 kg/m².    GENERAL:  Cooperative pleasant  female well dressed well groomed    EXTREMITY:  Fullness of the anterolateral joint line.  Positive crepitation.  Positive Nati on the lateral joint line.  Valgus angulation of about 5° right knee.  Patellar tracking central.  No suprapatellar effusion.  Calf soft.  No popliteal recess fullness or cyst.  Normal pulses cap refill right lower extremity.  No peripheral edema.  Hip and ankle range motion of the right are normal.  Right knee extension 0 flexion 130+         MEDICATIONS: Current Medications[1]    ALLERGIES:   Review of patient's allergies indicates:   Allergen Reactions    Scopolamine Rash       MEDICAL HISTORY:   Past Medical History:   Diagnosis Date    Asthma     Colon cancer 2013    radiation and chemo    Rectal cancer 08/01/2012       SURGICAL HISTORY:   Past Surgical History:   Procedure Laterality Date    colon cancer  08/01/2012    FOOT SURGERY Right     HYSTEROSCOPY WITH DILATION AND CURETTAGE OF UTERUS N/A 08/31/2022    Procedure: HYSTEROSCOPY, WITH DILATION AND CURETTAGE OF UTERUS MYOSURE;  Surgeon: Mariah Babin MD;  Location: [facility];  Service: OB/GYN;  Laterality: N/A;       REVIEW OF SYSTEMS:   No fevers, chills, sweats, chest pain or shortness of breath.    SOCIAL HISTORY:   Social History     Occupational History    Not on file   Tobacco Use    Smoking status: Never    Smokeless tobacco: Never   Vaping Use    Vaping status: Never Used   Substance and Sexual Activity    Alcohol use: No    Drug use: No    Sexual activity: Yes     Partners: Male     Birth control/protection: None       Patient Type:  Established Patient  Visit Type: (CPT 33048 - Estab 30-39 min)     30 minute patient encounter  This includes face to face time and non-face to face time preparing to see the patient (eg, review of tests),   obtaining and/or reviewing separately obtained history, documenting clinical information in the electronic or other health record, independently interpreting results   and communicating results to the patient/family/caregiver, or care coordinator.        Modifier - Yes. This patient has a chronic osteoarthritis diagnosis/condition. Today's visit is associated with current or anticipated ongoing medical care related to this patient's diagnosis of osteoarthritis. Currently there is no cure of osteoarthritis and the patient will require regular follow up to manage symptoms and progression. I, Jason Franco M.D., will be the primary medical provider for ongoing episodic treatment every 3-6 months as needed. As such, CPT code  is the appropriate add-on code to accompany the other E/M billing for this visit.       DISCLAIMER: This note was prepared with [employer] voice recognition transcription software. Garbled syntax, mangled pronouns, and other bizarre constructions may be attributed to that software system.      Jason Franco M.D.  Orthopedic Surgeon  [facility]             [1]   Current Outpatient Medications:     albuterol (PROVENTIL) 2.5 mg /3 mL (0.083 %) nebulizer solution, , Disp: , Rfl:     DUPIXENT  mg/1.14 mL PnIj, Inject 200 mg into the skin every 14 (fourteen) days., Disp: , Rfl:     estradiol 50 mg pellet, Inject 50 mg into the muscle. Every 12-14 weeks, Disp: , Rfl:     fluticasone-umeclidin-vilanter (TRELEGY ELLIPTA) 200-62.5-25 mcg inhaler, Inhale 1 puff into the lungs once daily., Disp: 180 each, Rfl: 3    metFORMIN (GLUCOPHAGE-XR) 500 MG ER 24hr tablet, Take 2 tablets (1,000 mg total) by mouth 2 (two) times daily with meals., Disp: 360 tablet, Rfl: 3     montelukast (SINGULAIR) 10 mg tablet, Take 1 tablet (10 mg total) by mouth once daily., Disp: 90 tablet, Rfl: 3    progesterone (PROMETRIUM) 200 MG capsule, TAKE 1 CAPSULE BY MOUTH NIGHTLY AT BEDTIME, Disp: , Rfl:     spironolactone (ALDACTONE) 25 MG tablet, Take 25 mg by mouth 2 (two) times daily., Disp: , Rfl:     testosterone 100 mg pellet, Inject 100 mg into the muscle. Every 12-14 weeks, Disp: , Rfl:     traZODone (DESYREL) 50 MG tablet, Take 1 tablet (50 mg total) by mouth every evening., Disp: 90 tablet, Rfl: 1

## 2025-07-15 ENCOUNTER — OFFICE VISIT (OUTPATIENT)
Dept: ORTHOPEDICS | Facility: CLINIC | Age: 60
End: 2025-07-15
Payer: OTHER GOVERNMENT

## 2025-07-15 VITALS — BODY MASS INDEX: 33.49 KG/M2 | HEIGHT: 64 IN | WEIGHT: 196.19 LBS

## 2025-07-15 DIAGNOSIS — S83.281D TEAR OF LATERAL MENISCUS OF RIGHT KNEE, CURRENT, UNSPECIFIED TEAR TYPE, SUBSEQUENT ENCOUNTER: Primary | ICD-10-CM

## 2025-07-15 DIAGNOSIS — M17.11 PRIMARY OSTEOARTHRITIS OF RIGHT KNEE: ICD-10-CM

## 2025-07-15 DIAGNOSIS — M25.569 KNEE PAIN, UNSPECIFIED CHRONICITY, UNSPECIFIED LATERALITY: ICD-10-CM

## 2025-07-15 PROCEDURE — 99214 OFFICE O/P EST MOD 30 MIN: CPT | Mod: PBBFAC | Performed by: ORTHOPAEDIC SURGERY

## 2025-07-15 PROCEDURE — 99999 PR PBB SHADOW E&M-EST. PATIENT-LVL IV: CPT | Mod: PBBFAC,,, | Performed by: ORTHOPAEDIC SURGERY

## 2025-07-15 PROCEDURE — 99214 OFFICE O/P EST MOD 30 MIN: CPT | Mod: S$PBB,,, | Performed by: ORTHOPAEDIC SURGERY

## 2025-07-15 NOTE — PATIENT INSTRUCTIONS
Encounter Diagnosis   Name Primary?    Primary osteoarthritis of right knee Yes       ASSESSMENT:  Kellgren Wilmer grade 3-4 with a valgus angulation  Mechanical locking symptoms when sitting and pain going downstairs    TREATMENT/PLAN:  Hinged knee brace for mechanical support right knee  Continue Mobic PRN  Home exercise program with quadriceps hamstring and calf strengthening was given.  MRI of the right knee to evaluate for a meniscal tear laterally that could be treated with the arthroscopy.  The patient is having mechanical symptoms of locking of the knee.  Discussed possible future treatment injections with a corticosteroid for swelling and/or Visco supplement if not having a large meniscal tear.  Also discussed possibly in the future knee replacement if she has progressive symptoms despite conservative treatment    Return to office after MRI for further discussions regarding treatment options to include possible arthroscopy, for partial lateral meniscectomy.

## 2025-07-23 ENCOUNTER — HOSPITAL ENCOUNTER (OUTPATIENT)
Dept: RADIOLOGY | Facility: HOSPITAL | Age: 60
Discharge: HOME | End: 2025-07-23
Attending: ORTHOPAEDIC SURGERY
Payer: OTHER GOVERNMENT

## 2025-07-23 DIAGNOSIS — M17.11 PRIMARY OSTEOARTHRITIS OF RIGHT KNEE: ICD-10-CM

## 2025-07-23 DIAGNOSIS — S83.281D TEAR OF LATERAL MENISCUS OF RIGHT KNEE, CURRENT, UNSPECIFIED TEAR TYPE, SUBSEQUENT ENCOUNTER: ICD-10-CM

## 2025-07-23 PROCEDURE — 73721 MRI JNT OF LWR EXTRE W/O DYE: CPT | Mod: TC,RT

## 2025-07-23 PROCEDURE — 73721 MRI JNT OF LWR EXTRE W/O DYE: CPT | Mod: 26,RT,, | Performed by: RADIOLOGY

## 2025-08-08 ENCOUNTER — OFFICE VISIT (OUTPATIENT)
Dept: ORTHOPEDICS | Facility: CLINIC | Age: 60
End: 2025-08-08
Payer: OTHER GOVERNMENT

## 2025-08-08 VITALS — BODY MASS INDEX: 33.49 KG/M2 | HEIGHT: 64 IN | WEIGHT: 196.19 LBS

## 2025-08-08 DIAGNOSIS — S83.281D TEAR OF LATERAL MENISCUS OF RIGHT KNEE, CURRENT, UNSPECIFIED TEAR TYPE, SUBSEQUENT ENCOUNTER: ICD-10-CM

## 2025-08-08 DIAGNOSIS — M17.11 PRIMARY OSTEOARTHRITIS OF RIGHT KNEE: Primary | ICD-10-CM

## 2025-08-08 PROCEDURE — 99999 PR PBB SHADOW E&M-EST. PATIENT-LVL III: CPT | Mod: PBBFAC,,, | Performed by: ORTHOPAEDIC SURGERY

## 2025-08-08 PROCEDURE — 99213 OFFICE O/P EST LOW 20 MIN: CPT | Mod: PBBFAC | Performed by: ORTHOPAEDIC SURGERY

## 2025-08-08 NOTE — PATIENT INSTRUCTIONS
Encounter Diagnoses   Name Primary?    Primary osteoarthritis of right knee Yes    Tear of lateral meniscus of right knee, current, unspecified tear type, subsequent encounter        ASSESSMENT:  MRI, right knee, demonstrates tricompartmental arthritis change with severe arthritis of the lateral compartment  Macerated anterior horn lateral meniscus and oblique cleavage tear posterior lateral meniscus.  Medial meniscus intact  The patient having persistent recurrent mechanical popping and locking symptoms of the right knee consistent with a posterior horn lateral meniscal tear    TREATMENT/PLAN:  The patient is a candidate for arthroscopy for partial lateral meniscectomy due to mechanical locking and popping.  She is not at the point of debilitation and pain that would require total knee replacement.  Recommend most conservative care at this time in the form of arthroscopy.  Discussed the postop recovery time and recommend up to 3 weeks off of the walking standing job.  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  She is in the process of moving homes, under schedule allows she will call to schedule right knee arthroscopy for partial lateral meniscectomy.  Recommend therapy twice a week for 3 weeks postop for range motion and strengthening  I discussed her using crutches for 3-4 days and progress weight-bearing as tolerated  Postop plan for tramadol/Tylenol and Aleve.

## 2025-08-08 NOTE — PROGRESS NOTES
Jason Franco MD  Orthopedic Surgeon   [address] [place] [address]         Name: Beena Potter  MRN: [id number]    Patient ID: Beena Potter is a 60 y.o. female     Reason for visit:  Follow up right knee after MRI    Patient Instructions     Encounter Diagnoses   Name Primary?    Primary osteoarthritis of right knee Yes    Tear of lateral meniscus of right knee, current, unspecified tear type, subsequent encounter        ASSESSMENT:  MRI, right knee, demonstrates tricompartmental arthritis change with severe arthritis of the lateral compartment  Macerated anterior horn lateral meniscus and oblique cleavage tear posterior lateral meniscus.  Medial meniscus intact  The patient having persistent recurrent mechanical popping and locking symptoms of the right knee consistent with a posterior horn lateral meniscal tear    TREATMENT/PLAN:  The patient is a candidate for arthroscopy for partial lateral meniscectomy due to mechanical locking and popping.  She is not at the point of debilitation and pain that would require total knee replacement.  Recommend most conservative care at this time in the form of arthroscopy.  Discussed the postop recovery time and recommend up to 3 weeks off of the walking standing job.  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  She is in the process of moving homes, under schedule allows she will call to schedule right knee arthroscopy for partial lateral meniscectomy.  Recommend therapy twice a week for 3 weeks postop for range motion and strengthening  I discussed her using crutches for 3-4 days and progress weight-bearing as tolerated  Postop plan for tramadol/Tylenol and Aleve.      HISTORY OF PRESENT ILLNESS:   Beena Potter is a 60 y.o.  female.Patient presents today for follow up.  Mobic is helping.  She is alternating with a Aleve when needed.  She has worked on a home exercise program which has helped a bit but it has not prevented her knee from popping and locking.  She wore the hinged knee brace but it was uncomfortable in her knee was more stiff when she took it off.  Objective     MRI:  Right knee  FINDINGS:  Osteophyte formation in all 3 compartments.  Severe chondromalacia in the lateral compartment with complete denudation of the articular cartilage in the weightbearing aspect of the lateral femoral condyle and medially entirety of the lateral tibial plateau.  There is underlying subchondral marrow edema in the lateral compartment.  Full-thickness chondral fissure in the lateral facet of the patella with underlying subchondral marrow edema.  Near full-thickness chondral fissure in the medial facet of the patella.     No fracture or avascular necrosis.  Moderate joint effusion and synovitis in the joint.  Small Baker's cyst.     The ACL, PCL, lateral collateral complex, MCL, extensor mechanism, and popliteus tendon are intact.     There is maceration of the anterior horn and body of the lateral meniscus.  Marked free edge fraying and obliquely oriented horizontal cleavage tear of the posterior horn lateral meniscus.  Medial meniscus is intact.     Impression:     1.  Tricompartment osteoarthritis, worst in the lateral compartment where there is full-thickness chondral loss.  2.  Moderate joint effusion and synovitis in the joint.  Small Baker cyst.  3.  Maceration of the anterior horn and body of the lateral meniscus and marked free edge fraying as well as a loculated horizontal cleavage tear of the posterior horn lateral meniscus.    PHYSICAL EXAMINATION: Body mass index is 33.68 kg/m².    GENERAL:   Cooperative pleasant  female well dressed well groomed    EXTREMITY:  Right knee with a valgus deformity of about 7° corrected to  midline with a varus stress.  Positive popping/catching with Nati maneuver of the lateral joint line.  Positive pain along lateral joint line.  Fullness of the anterolateral soft tissue of the right knee.  Skin intact around the right knee with no rashes lesions or scars.  No peripheral edema of the right lower extremity.  Right hip and ankle range of motion normal.  Right straight leg raise negative.  Central patellar tracking.  ACL and PCL stable on the right knee.         MEDICATIONS: Current Medications[1]    ALLERGIES:   Review of patient's allergies indicates:   Allergen Reactions    Scopolamine Rash       MEDICAL HISTORY:   Past Medical History:   Diagnosis Date    Asthma     Colon cancer 2013    radiation and chemo    Rectal cancer 08/01/2012       SURGICAL HISTORY:   Past Surgical History:   Procedure Laterality Date    colon cancer  08/01/2012    FOOT SURGERY Right     HYSTEROSCOPY WITH DILATION AND CURETTAGE OF UTERUS N/A 08/31/2022    Procedure: HYSTEROSCOPY, WITH DILATION AND CURETTAGE OF UTERUS MYOSURE;  Surgeon: Mariah Babin MD;  Location: [facility];  Service: OB/GYN;  Laterality: N/A;         Patient Type: Established Patient  Visit Type: (CPT 39567 - Estab 30-39 min)     30 minute patient encounter  This includes face to face time and non-face to face time preparing to see the patient (eg, review of tests),   obtaining and/or reviewing separately obtained history, documenting clinical information in the electronic or other health record, independently interpreting results   and communicating results to the patient/family/caregiver, or care coordinator.     DISCLAIMER: This note was prepared with [employer] voice recognition transcription software. Garbled syntax, mangled pronouns, and other bizarre constructions may be attributed to that software system.      Jason Franco M.D.  Orthopedic Surgeon  [facility]           [1]   Current Outpatient Medications:     albuterol  (PROVENTIL) 2.5 mg /3 mL (0.083 %) nebulizer solution, , Disp: , Rfl:     DUPIXENT  mg/1.14 mL PnIj, Inject 200 mg into the skin every 14 (fourteen) days., Disp: , Rfl:     estradiol 50 mg pellet, Inject 50 mg into the muscle. Every 12-14 weeks, Disp: , Rfl:     fluticasone-umeclidin-vilanter (TRELEGY ELLIPTA) 200-62.5-25 mcg inhaler, Inhale 1 puff into the lungs once daily., Disp: 180 each, Rfl: 3    metFORMIN (GLUCOPHAGE-XR) 500 MG ER 24hr tablet, Take 2 tablets (1,000 mg total) by mouth 2 (two) times daily with meals., Disp: 360 tablet, Rfl: 3    montelukast (SINGULAIR) 10 mg tablet, Take 1 tablet (10 mg total) by mouth once daily., Disp: 90 tablet, Rfl: 3    progesterone (PROMETRIUM) 200 MG capsule, TAKE 1 CAPSULE BY MOUTH NIGHTLY AT BEDTIME, Disp: , Rfl:     spironolactone (ALDACTONE) 25 MG tablet, Take 25 mg by mouth 2 (two) times daily., Disp: , Rfl:     testosterone 100 mg pellet, Inject 100 mg into the muscle. Every 12-14 weeks, Disp: , Rfl:     traZODone (DESYREL) 50 MG tablet, Take 1 tablet (50 mg total) by mouth every evening., Disp: 90 tablet, Rfl: 1

## 2025-08-20 ENCOUNTER — HOSPITAL ENCOUNTER (OUTPATIENT)
Dept: RADIOLOGY | Facility: HOSPITAL | Age: 60
Discharge: HOME | End: 2025-08-20
Attending: FAMILY MEDICINE
Payer: OTHER GOVERNMENT

## 2025-08-20 ENCOUNTER — PATIENT MESSAGE (OUTPATIENT)
Dept: INTERNAL MEDICINE | Facility: CLINIC | Age: 60
End: 2025-08-20
Payer: OTHER GOVERNMENT

## 2025-08-20 DIAGNOSIS — Z12.31 ENCOUNTER FOR SCREENING MAMMOGRAM FOR BREAST CANCER: ICD-10-CM

## 2025-08-20 DIAGNOSIS — Z12.11 COLON CANCER SCREENING: Primary | ICD-10-CM

## 2025-08-20 DIAGNOSIS — C20 MALIGNANT NEOPLASM OF RECTUM: ICD-10-CM

## 2025-08-20 PROCEDURE — 77063 BREAST TOMOSYNTHESIS BI: CPT | Mod: 26,,, | Performed by: STUDENT IN AN ORGANIZED HEALTH CARE EDUCATION/TRAINING PROGRAM

## 2025-08-20 PROCEDURE — 77067 SCR MAMMO BI INCL CAD: CPT | Mod: TC

## 2025-08-20 PROCEDURE — 77067 SCR MAMMO BI INCL CAD: CPT | Mod: 26,,, | Performed by: STUDENT IN AN ORGANIZED HEALTH CARE EDUCATION/TRAINING PROGRAM

## 2025-08-22 ENCOUNTER — OFFICE VISIT (OUTPATIENT)
Dept: OBSTETRICS AND GYNECOLOGY | Facility: CLINIC | Age: 60
End: 2025-08-22
Payer: OTHER GOVERNMENT

## 2025-08-22 VITALS
HEIGHT: 64 IN | DIASTOLIC BLOOD PRESSURE: 70 MMHG | WEIGHT: 197.56 LBS | BODY MASS INDEX: 33.73 KG/M2 | SYSTOLIC BLOOD PRESSURE: 124 MMHG

## 2025-08-22 DIAGNOSIS — Z01.419 ENCOUNTER FOR GYNECOLOGICAL EXAMINATION (GENERAL) (ROUTINE) WITHOUT ABNORMAL FINDINGS: Primary | ICD-10-CM

## 2025-08-22 DIAGNOSIS — Z12.31 SCREENING MAMMOGRAM, ENCOUNTER FOR: ICD-10-CM

## 2025-08-22 DIAGNOSIS — Z12.4 SCREENING FOR CERVICAL CANCER: ICD-10-CM

## 2025-08-22 PROCEDURE — 99999 PR PBB SHADOW E&M-EST. PATIENT-LVL III: CPT | Mod: PBBFAC,,, | Performed by: OBSTETRICS & GYNECOLOGY

## 2025-08-22 PROCEDURE — 99213 OFFICE O/P EST LOW 20 MIN: CPT | Mod: PBBFAC,PN | Performed by: OBSTETRICS & GYNECOLOGY

## (undated) DEVICE — SOL IRR NACL .9% 3000ML

## (undated) DEVICE — GLOVE SURGICAL LATEX SZ 6.5

## (undated) DEVICE — PACK DRAPE PERI/GYN TIBURON

## (undated) DEVICE — SEAL LENS SCOPE MYOSURE

## (undated) DEVICE — DEVICE MYOSURE LITE TISS REM

## (undated) DEVICE — CATH URETHRAL RED RUBBER 18FR

## (undated) DEVICE — DRAPE UINDERBUT GRAD PCH

## (undated) DEVICE — COVER LIGHT HANDLE 80/CA

## (undated) DEVICE — CONTAINER SPECIMEN OR STER 4OZ

## (undated) DEVICE — TOWEL OR DISP STRL BLUE 4/PK

## (undated) DEVICE — SET CYSTO IRRIGATION UNIV SPIK

## (undated) DEVICE — GOWN POLY REINF BRTH SLV XL

## (undated) DEVICE — DRESSING TELFA N ADH 3X8

## (undated) DEVICE — JELLY SURGILUBE LUBE PKT 3GM

## (undated) DEVICE — MANIFOLD 4 PORT

## (undated) DEVICE — TUBING MEDI-VAC 20FT .25IN

## (undated) DEVICE — ELECTRODE REM PLYHSV RETURN 9

## (undated) DEVICE — PACK FLUENT DISPOSABLE

## (undated) DEVICE — TRAY SKIN SCRUB WET PREMIUM

## (undated) DEVICE — PACK BASIC SETUP SC BR

## (undated) DEVICE — UNDERGLOVES BIOGEL PI SZ 7 LF